# Patient Record
Sex: FEMALE | Race: WHITE | NOT HISPANIC OR LATINO | Employment: OTHER | URBAN - METROPOLITAN AREA
[De-identification: names, ages, dates, MRNs, and addresses within clinical notes are randomized per-mention and may not be internally consistent; named-entity substitution may affect disease eponyms.]

---

## 2017-12-19 ENCOUNTER — AMBULATORY - HEALTHEAST (OUTPATIENT)
Dept: NEUROLOGY | Facility: CLINIC | Age: 82
End: 2017-12-19

## 2017-12-19 ENCOUNTER — HOME CARE/HOSPICE - HEALTHEAST (OUTPATIENT)
Dept: HOME HEALTH SERVICES | Facility: HOME HEALTH | Age: 82
End: 2017-12-19

## 2017-12-19 DIAGNOSIS — G20.A1 PARKINSON DISEASE (H): ICD-10-CM

## 2017-12-22 ENCOUNTER — COMMUNICATION - HEALTHEAST (OUTPATIENT)
Dept: HOME HEALTH SERVICES | Facility: HOME HEALTH | Age: 82
End: 2017-12-22

## 2017-12-29 ENCOUNTER — HOME CARE/HOSPICE - HEALTHEAST (OUTPATIENT)
Dept: HOME HEALTH SERVICES | Facility: HOME HEALTH | Age: 82
End: 2017-12-29

## 2018-01-01 ENCOUNTER — HOME CARE/HOSPICE - HEALTHEAST (OUTPATIENT)
Dept: HOME HEALTH SERVICES | Facility: HOME HEALTH | Age: 83
End: 2018-01-01

## 2018-04-29 ENCOUNTER — ANESTHESIA - HEALTHEAST (OUTPATIENT)
Dept: SURGERY | Facility: HOSPITAL | Age: 83
End: 2018-04-29

## 2018-04-29 ENCOUNTER — SURGERY - HEALTHEAST (OUTPATIENT)
Dept: SURGERY | Facility: HOSPITAL | Age: 83
End: 2018-04-29

## 2018-04-29 ASSESSMENT — MIFFLIN-ST. JEOR: SCORE: 756.97

## 2018-05-07 ENCOUNTER — OFFICE VISIT - HEALTHEAST (OUTPATIENT)
Dept: GERIATRICS | Facility: CLINIC | Age: 83
End: 2018-05-07

## 2018-05-07 DIAGNOSIS — J69.0 ASPIRATION PNEUMONIA (H): ICD-10-CM

## 2018-05-07 DIAGNOSIS — F03.90 DEMENTIA WITHOUT BEHAVIORAL DISTURBANCE, UNSPECIFIED DEMENTIA TYPE: ICD-10-CM

## 2018-05-07 DIAGNOSIS — R13.12 OROPHARYNGEAL DYSPHAGIA: ICD-10-CM

## 2018-05-07 DIAGNOSIS — D62 ACUTE BLOOD LOSS ANEMIA: ICD-10-CM

## 2018-05-07 DIAGNOSIS — R53.81 PHYSICAL DECONDITIONING: ICD-10-CM

## 2018-05-07 DIAGNOSIS — F33.41 RECURRENT MAJOR DEPRESSIVE DISORDER, IN PARTIAL REMISSION (H): ICD-10-CM

## 2018-05-07 DIAGNOSIS — S72.002A CLOSED DISPLACED FRACTURE OF LEFT FEMORAL NECK (H): ICD-10-CM

## 2018-05-07 DIAGNOSIS — F51.01 PRIMARY INSOMNIA: ICD-10-CM

## 2018-05-07 DIAGNOSIS — Z96.649 S/P HIP HEMIARTHROPLASTY: ICD-10-CM

## 2018-05-07 DIAGNOSIS — G20.A1 PARKINSON DISEASE (H): ICD-10-CM

## 2018-05-08 ENCOUNTER — OFFICE VISIT - HEALTHEAST (OUTPATIENT)
Dept: GERIATRICS | Facility: CLINIC | Age: 83
End: 2018-05-08

## 2018-05-08 ENCOUNTER — RECORDS - HEALTHEAST (OUTPATIENT)
Dept: LAB | Facility: CLINIC | Age: 83
End: 2018-05-08

## 2018-05-08 DIAGNOSIS — G20.A1 PARKINSON DISEASE (H): ICD-10-CM

## 2018-05-08 DIAGNOSIS — S72.002A CLOSED DISPLACED FRACTURE OF LEFT FEMORAL NECK (H): ICD-10-CM

## 2018-05-08 DIAGNOSIS — J69.0 ASPIRATION PNEUMONIA OF RIGHT LOWER LOBE, UNSPECIFIED ASPIRATION PNEUMONIA TYPE (H): ICD-10-CM

## 2018-05-08 DIAGNOSIS — F03.90 DEMENTIA WITHOUT BEHAVIORAL DISTURBANCE, UNSPECIFIED DEMENTIA TYPE: ICD-10-CM

## 2018-05-09 LAB
ANION GAP SERPL CALCULATED.3IONS-SCNC: 5 MMOL/L (ref 5–18)
BASOPHILS # BLD AUTO: 0 THOU/UL (ref 0–0.2)
BASOPHILS NFR BLD AUTO: 1 % (ref 0–2)
BUN SERPL-MCNC: 25 MG/DL (ref 8–28)
CALCIUM SERPL-MCNC: 8.9 MG/DL (ref 8.5–10.5)
CHLORIDE BLD-SCNC: 111 MMOL/L (ref 98–107)
CO2 SERPL-SCNC: 29 MMOL/L (ref 22–31)
CREAT SERPL-MCNC: 0.67 MG/DL (ref 0.6–1.1)
EOSINOPHIL # BLD AUTO: 0.2 THOU/UL (ref 0–0.4)
EOSINOPHIL NFR BLD AUTO: 2 % (ref 0–6)
ERYTHROCYTE [DISTWIDTH] IN BLOOD BY AUTOMATED COUNT: 14.7 % (ref 11–14.5)
GFR SERPL CREATININE-BSD FRML MDRD: >60 ML/MIN/1.73M2
GLUCOSE BLD-MCNC: 98 MG/DL (ref 70–125)
HCT VFR BLD AUTO: 30 % (ref 35–47)
HGB BLD-MCNC: 9.7 G/DL (ref 12–16)
LYMPHOCYTES # BLD AUTO: 1.4 THOU/UL (ref 0.8–4.4)
LYMPHOCYTES NFR BLD AUTO: 18 % (ref 20–40)
MCH RBC QN AUTO: 29.8 PG (ref 27–34)
MCHC RBC AUTO-ENTMCNC: 32.3 G/DL (ref 32–36)
MCV RBC AUTO: 92 FL (ref 80–100)
MONOCYTES # BLD AUTO: 0.6 THOU/UL (ref 0–0.9)
MONOCYTES NFR BLD AUTO: 7 % (ref 2–10)
NEUTROPHILS # BLD AUTO: 5.6 THOU/UL (ref 2–7.7)
NEUTROPHILS NFR BLD AUTO: 72 % (ref 50–70)
PLATELET # BLD AUTO: 348 THOU/UL (ref 140–440)
PMV BLD AUTO: 9.7 FL (ref 8.5–12.5)
POTASSIUM BLD-SCNC: 3.8 MMOL/L (ref 3.5–5)
RBC # BLD AUTO: 3.25 MILL/UL (ref 3.8–5.4)
SODIUM SERPL-SCNC: 145 MMOL/L (ref 136–145)
WBC: 7.8 THOU/UL (ref 4–11)

## 2018-05-10 ENCOUNTER — OFFICE VISIT - HEALTHEAST (OUTPATIENT)
Dept: GERIATRICS | Facility: CLINIC | Age: 83
End: 2018-05-10

## 2018-05-10 DIAGNOSIS — D62 ACUTE BLOOD LOSS ANEMIA: ICD-10-CM

## 2018-05-10 DIAGNOSIS — S72.002A CLOSED DISPLACED FRACTURE OF LEFT FEMORAL NECK (H): ICD-10-CM

## 2018-05-10 DIAGNOSIS — F03.90 DEMENTIA WITHOUT BEHAVIORAL DISTURBANCE, UNSPECIFIED DEMENTIA TYPE: ICD-10-CM

## 2018-05-10 DIAGNOSIS — R53.81 PHYSICAL DECONDITIONING: ICD-10-CM

## 2018-05-10 DIAGNOSIS — Z96.649 S/P HIP HEMIARTHROPLASTY: ICD-10-CM

## 2018-05-10 DIAGNOSIS — G20.A1 PARKINSON DISEASE (H): ICD-10-CM

## 2018-05-10 DIAGNOSIS — R13.12 OROPHARYNGEAL DYSPHAGIA: ICD-10-CM

## 2018-05-14 ENCOUNTER — OFFICE VISIT - HEALTHEAST (OUTPATIENT)
Dept: GERIATRICS | Facility: CLINIC | Age: 83
End: 2018-05-14

## 2018-05-14 DIAGNOSIS — D62 ACUTE BLOOD LOSS ANEMIA: ICD-10-CM

## 2018-05-14 DIAGNOSIS — J69.0 ASPIRATION PNEUMONIA OF RIGHT LOWER LOBE, UNSPECIFIED ASPIRATION PNEUMONIA TYPE (H): ICD-10-CM

## 2018-05-14 DIAGNOSIS — R13.12 OROPHARYNGEAL DYSPHAGIA: ICD-10-CM

## 2018-05-14 DIAGNOSIS — S72.002A CLOSED DISPLACED FRACTURE OF LEFT FEMORAL NECK (H): ICD-10-CM

## 2018-05-14 DIAGNOSIS — F03.90 DEMENTIA WITHOUT BEHAVIORAL DISTURBANCE, UNSPECIFIED DEMENTIA TYPE: ICD-10-CM

## 2018-05-14 DIAGNOSIS — R53.81 PHYSICAL DECONDITIONING: ICD-10-CM

## 2018-05-14 DIAGNOSIS — Z96.649 S/P HIP HEMIARTHROPLASTY: ICD-10-CM

## 2018-05-15 ENCOUNTER — OFFICE VISIT - HEALTHEAST (OUTPATIENT)
Dept: GERIATRICS | Facility: CLINIC | Age: 83
End: 2018-05-15

## 2018-05-15 DIAGNOSIS — S72.002A CLOSED DISPLACED FRACTURE OF LEFT FEMORAL NECK (H): ICD-10-CM

## 2018-05-15 DIAGNOSIS — Z96.649 S/P HIP HEMIARTHROPLASTY: ICD-10-CM

## 2018-05-15 DIAGNOSIS — F03.90 DEMENTIA WITHOUT BEHAVIORAL DISTURBANCE, UNSPECIFIED DEMENTIA TYPE: ICD-10-CM

## 2018-05-15 DIAGNOSIS — G20.A1 PARKINSON DISEASE (H): ICD-10-CM

## 2018-05-17 ENCOUNTER — OFFICE VISIT - HEALTHEAST (OUTPATIENT)
Dept: GERIATRICS | Facility: CLINIC | Age: 83
End: 2018-05-17

## 2018-05-17 DIAGNOSIS — S72.002A CLOSED DISPLACED FRACTURE OF LEFT FEMORAL NECK (H): ICD-10-CM

## 2018-05-17 DIAGNOSIS — R13.12 OROPHARYNGEAL DYSPHAGIA: ICD-10-CM

## 2018-05-17 DIAGNOSIS — F03.90 DEMENTIA WITHOUT BEHAVIORAL DISTURBANCE, UNSPECIFIED DEMENTIA TYPE: ICD-10-CM

## 2018-05-17 DIAGNOSIS — D62 ACUTE BLOOD LOSS ANEMIA: ICD-10-CM

## 2018-05-17 DIAGNOSIS — J69.0 ASPIRATION PNEUMONIA OF RIGHT LOWER LOBE, UNSPECIFIED ASPIRATION PNEUMONIA TYPE (H): ICD-10-CM

## 2018-05-17 DIAGNOSIS — Z96.649 S/P HIP HEMIARTHROPLASTY: ICD-10-CM

## 2018-05-17 DIAGNOSIS — G20.A1 PARKINSON DISEASE (H): ICD-10-CM

## 2018-05-17 DIAGNOSIS — R53.81 PHYSICAL DECONDITIONING: ICD-10-CM

## 2018-05-18 ENCOUNTER — RECORDS - HEALTHEAST (OUTPATIENT)
Dept: LAB | Facility: CLINIC | Age: 83
End: 2018-05-18

## 2018-05-21 ENCOUNTER — OFFICE VISIT - HEALTHEAST (OUTPATIENT)
Dept: GERIATRICS | Facility: CLINIC | Age: 83
End: 2018-05-21

## 2018-05-21 DIAGNOSIS — F51.01 PRIMARY INSOMNIA: ICD-10-CM

## 2018-05-21 DIAGNOSIS — G20.A1 PARKINSON DISEASE (H): ICD-10-CM

## 2018-05-21 DIAGNOSIS — R13.12 OROPHARYNGEAL DYSPHAGIA: ICD-10-CM

## 2018-05-21 DIAGNOSIS — Z96.649 S/P HIP HEMIARTHROPLASTY: ICD-10-CM

## 2018-05-21 DIAGNOSIS — D62 ACUTE BLOOD LOSS ANEMIA: ICD-10-CM

## 2018-05-21 DIAGNOSIS — R53.81 PHYSICAL DECONDITIONING: ICD-10-CM

## 2018-05-21 DIAGNOSIS — S72.002A CLOSED FRACTURE OF LEFT HIP, INITIAL ENCOUNTER (H): ICD-10-CM

## 2018-05-21 DIAGNOSIS — F33.41 RECURRENT MAJOR DEPRESSIVE DISORDER, IN PARTIAL REMISSION (H): ICD-10-CM

## 2018-05-21 DIAGNOSIS — F03.90 DEMENTIA WITHOUT BEHAVIORAL DISTURBANCE, UNSPECIFIED DEMENTIA TYPE: ICD-10-CM

## 2018-05-21 LAB
BASOPHILS # BLD AUTO: 0.1 THOU/UL (ref 0–0.2)
BASOPHILS NFR BLD AUTO: 1 % (ref 0–2)
EOSINOPHIL # BLD AUTO: 0.2 THOU/UL (ref 0–0.4)
EOSINOPHIL NFR BLD AUTO: 3 % (ref 0–6)
ERYTHROCYTE [DISTWIDTH] IN BLOOD BY AUTOMATED COUNT: 15.9 % (ref 11–14.5)
HCT VFR BLD AUTO: 37.3 % (ref 35–47)
HGB BLD-MCNC: 11.3 G/DL (ref 12–16)
LYMPHOCYTES # BLD AUTO: 1 THOU/UL (ref 0.8–4.4)
LYMPHOCYTES NFR BLD AUTO: 16 % (ref 20–40)
MCH RBC QN AUTO: 29 PG (ref 27–34)
MCHC RBC AUTO-ENTMCNC: 30.3 G/DL (ref 32–36)
MCV RBC AUTO: 96 FL (ref 80–100)
MONOCYTES # BLD AUTO: 0.3 THOU/UL (ref 0–0.9)
MONOCYTES NFR BLD AUTO: 5 % (ref 2–10)
NEUTROPHILS # BLD AUTO: 4.6 THOU/UL (ref 2–7.7)
NEUTROPHILS NFR BLD AUTO: 74 % (ref 50–70)
PLATELET # BLD AUTO: 320 THOU/UL (ref 140–440)
PMV BLD AUTO: 9.9 FL (ref 8.5–12.5)
RBC # BLD AUTO: 3.89 MILL/UL (ref 3.8–5.4)
WBC: 6.2 THOU/UL (ref 4–11)

## 2018-05-22 ENCOUNTER — OFFICE VISIT - HEALTHEAST (OUTPATIENT)
Dept: GERIATRICS | Facility: CLINIC | Age: 83
End: 2018-05-22

## 2018-05-22 DIAGNOSIS — Z96.649 S/P HIP HEMIARTHROPLASTY: ICD-10-CM

## 2018-05-22 DIAGNOSIS — F03.90 DEMENTIA WITHOUT BEHAVIORAL DISTURBANCE, UNSPECIFIED DEMENTIA TYPE: ICD-10-CM

## 2018-05-22 DIAGNOSIS — G20.A1 PARKINSON DISEASE (H): ICD-10-CM

## 2018-05-22 DIAGNOSIS — L12.0 BULLOUS PEMPHIGOID (H): ICD-10-CM

## 2018-05-22 DIAGNOSIS — J69.0 ASPIRATION PNEUMONIA OF RIGHT LOWER LOBE, UNSPECIFIED ASPIRATION PNEUMONIA TYPE (H): ICD-10-CM

## 2018-05-22 DIAGNOSIS — S72.002A CLOSED DISPLACED FRACTURE OF LEFT FEMORAL NECK (H): ICD-10-CM

## 2018-05-24 ENCOUNTER — OFFICE VISIT - HEALTHEAST (OUTPATIENT)
Dept: GERIATRICS | Facility: CLINIC | Age: 83
End: 2018-05-24

## 2018-05-24 DIAGNOSIS — F51.01 PRIMARY INSOMNIA: ICD-10-CM

## 2018-05-24 DIAGNOSIS — S72.002A CLOSED DISPLACED FRACTURE OF LEFT FEMORAL NECK (H): ICD-10-CM

## 2018-05-24 DIAGNOSIS — R13.12 OROPHARYNGEAL DYSPHAGIA: ICD-10-CM

## 2018-05-24 DIAGNOSIS — R53.81 PHYSICAL DECONDITIONING: ICD-10-CM

## 2018-05-24 DIAGNOSIS — G20.A1 PARKINSON DISEASE (H): ICD-10-CM

## 2018-05-24 DIAGNOSIS — R25.1 TREMOR OF UNKNOWN ORIGIN: ICD-10-CM

## 2018-05-24 DIAGNOSIS — F03.90 DEMENTIA WITHOUT BEHAVIORAL DISTURBANCE, UNSPECIFIED DEMENTIA TYPE: ICD-10-CM

## 2018-05-29 ENCOUNTER — OFFICE VISIT - HEALTHEAST (OUTPATIENT)
Dept: GERIATRICS | Facility: CLINIC | Age: 83
End: 2018-05-29

## 2018-05-29 DIAGNOSIS — G20.A1 PARKINSON DISEASE (H): ICD-10-CM

## 2018-05-29 DIAGNOSIS — F03.90 DEMENTIA WITHOUT BEHAVIORAL DISTURBANCE, UNSPECIFIED DEMENTIA TYPE: ICD-10-CM

## 2018-05-29 DIAGNOSIS — J69.0 ASPIRATION PNEUMONIA OF RIGHT LOWER LOBE, UNSPECIFIED ASPIRATION PNEUMONIA TYPE (H): ICD-10-CM

## 2018-05-29 DIAGNOSIS — S72.002A CLOSED DISPLACED FRACTURE OF LEFT FEMORAL NECK (H): ICD-10-CM

## 2018-06-01 ENCOUNTER — OFFICE VISIT - HEALTHEAST (OUTPATIENT)
Dept: GERIATRICS | Facility: CLINIC | Age: 83
End: 2018-06-01

## 2018-06-01 DIAGNOSIS — R53.81 PHYSICAL DECONDITIONING: ICD-10-CM

## 2018-06-01 DIAGNOSIS — Z96.649 S/P HIP HEMIARTHROPLASTY: ICD-10-CM

## 2018-06-01 DIAGNOSIS — R13.12 OROPHARYNGEAL DYSPHAGIA: ICD-10-CM

## 2018-06-01 DIAGNOSIS — S72.002A CLOSED DISPLACED FRACTURE OF LEFT FEMORAL NECK (H): ICD-10-CM

## 2018-06-01 DIAGNOSIS — F03.90 DEMENTIA WITHOUT BEHAVIORAL DISTURBANCE, UNSPECIFIED DEMENTIA TYPE: ICD-10-CM

## 2018-06-01 DIAGNOSIS — G20.A1 PARKINSON DISEASE (H): ICD-10-CM

## 2018-06-01 DIAGNOSIS — S72.002A CLOSED FRACTURE OF LEFT HIP, INITIAL ENCOUNTER (H): ICD-10-CM

## 2018-06-04 ENCOUNTER — OFFICE VISIT - HEALTHEAST (OUTPATIENT)
Dept: GERIATRICS | Facility: CLINIC | Age: 83
End: 2018-06-04

## 2018-06-04 DIAGNOSIS — F33.41 RECURRENT MAJOR DEPRESSIVE DISORDER, IN PARTIAL REMISSION (H): ICD-10-CM

## 2018-06-04 DIAGNOSIS — R53.81 PHYSICAL DECONDITIONING: ICD-10-CM

## 2018-06-04 DIAGNOSIS — F03.90 DEMENTIA WITHOUT BEHAVIORAL DISTURBANCE, UNSPECIFIED DEMENTIA TYPE: ICD-10-CM

## 2018-06-04 DIAGNOSIS — G20.A1 PARKINSON DISEASE (H): ICD-10-CM

## 2018-06-04 DIAGNOSIS — S72.002A CLOSED FRACTURE OF LEFT HIP, INITIAL ENCOUNTER (H): ICD-10-CM

## 2018-06-04 DIAGNOSIS — Z96.649 S/P HIP HEMIARTHROPLASTY: ICD-10-CM

## 2018-06-05 ENCOUNTER — OFFICE VISIT - HEALTHEAST (OUTPATIENT)
Dept: GERIATRICS | Facility: CLINIC | Age: 83
End: 2018-06-05

## 2018-06-05 DIAGNOSIS — S72.002A CLOSED DISPLACED FRACTURE OF LEFT FEMORAL NECK (H): ICD-10-CM

## 2018-06-05 DIAGNOSIS — J69.0 ASPIRATION PNEUMONIA OF RIGHT LOWER LOBE, UNSPECIFIED ASPIRATION PNEUMONIA TYPE (H): ICD-10-CM

## 2018-06-05 DIAGNOSIS — G20.A1 PARKINSON DISEASE (H): ICD-10-CM

## 2018-06-08 ENCOUNTER — AMBULATORY - HEALTHEAST (OUTPATIENT)
Dept: GERIATRICS | Facility: CLINIC | Age: 83
End: 2018-06-08

## 2020-01-01 ENCOUNTER — HOME CARE/HOSPICE - HEALTHEAST (OUTPATIENT)
Dept: HOSPICE | Facility: HOSPICE | Age: 85
End: 2020-01-01

## 2020-01-01 ENCOUNTER — OFFICE VISIT (OUTPATIENT)
Dept: NEUROLOGY | Facility: CLINIC | Age: 85
End: 2020-01-01
Payer: COMMERCIAL

## 2020-01-01 ENCOUNTER — AMBULATORY - HEALTHEAST (OUTPATIENT)
Dept: FAMILY MEDICINE | Facility: CLINIC | Age: 85
End: 2020-01-01

## 2020-01-01 ENCOUNTER — HOME CARE/HOSPICE - HEALTHEAST (OUTPATIENT)
Dept: HOME HEALTH SERVICES | Facility: HOME HEALTH | Age: 85
End: 2020-01-01

## 2020-01-01 ENCOUNTER — COMMUNICATION - HEALTHEAST (OUTPATIENT)
Dept: SCHEDULING | Facility: CLINIC | Age: 85
End: 2020-01-01

## 2020-01-01 ENCOUNTER — AMBULATORY - HEALTHEAST (OUTPATIENT)
Dept: OTHER | Facility: CLINIC | Age: 85
End: 2020-01-01

## 2020-01-01 ENCOUNTER — AMBULATORY - HEALTHEAST (OUTPATIENT)
Dept: HOSPICE | Facility: HOSPICE | Age: 85
End: 2020-01-01

## 2020-01-01 VITALS — HEIGHT: 59 IN | BODY MASS INDEX: 19.15 KG/M2 | WEIGHT: 95 LBS

## 2020-01-01 DIAGNOSIS — F02.80 DEMENTIA DUE TO PARKINSON'S DISEASE WITHOUT BEHAVIORAL DISTURBANCE (H): ICD-10-CM

## 2020-01-01 DIAGNOSIS — G20.A1 PARKINSON DISEASE (H): Primary | ICD-10-CM

## 2020-01-01 DIAGNOSIS — G20.A1 DEMENTIA DUE TO PARKINSON'S DISEASE WITHOUT BEHAVIORAL DISTURBANCE (H): ICD-10-CM

## 2020-01-01 PROCEDURE — 99214 OFFICE O/P EST MOD 30 MIN: CPT | Mod: 95 | Performed by: PSYCHIATRY & NEUROLOGY

## 2020-01-01 RX ORDER — MIRTAZAPINE 15 MG/1
15 TABLET, FILM COATED ORAL
COMMUNITY
Start: 2020-01-01

## 2020-01-01 RX ORDER — SENNOSIDES A AND B 8.6 MG/1
TABLET, FILM COATED ORAL
COMMUNITY

## 2020-01-01 RX ORDER — CARBIDOPA AND LEVODOPA 25; 100 MG/1; MG/1
1 TABLET ORAL 3 TIMES DAILY
COMMUNITY

## 2020-01-01 RX ORDER — CARBIDOPA AND LEVODOPA 25; 100 MG/1; MG/1
1 TABLET, EXTENDED RELEASE ORAL
COMMUNITY
Start: 2020-01-01

## 2020-01-01 RX ORDER — DONEPEZIL HYDROCHLORIDE 10 MG/1
TABLET, FILM COATED ORAL
COMMUNITY
Start: 2020-01-01

## 2020-01-01 ASSESSMENT — MIFFLIN-ST. JEOR
SCORE: 751.55
SCORE: 762.42

## 2020-06-16 PROBLEM — E66.3 OVERWEIGHT: Status: ACTIVE | Noted: 2020-01-01

## 2020-06-16 PROBLEM — M81.0 SENILE OSTEOPOROSIS: Status: ACTIVE | Noted: 2020-01-01

## 2020-06-16 PROBLEM — R42 DIZZINESS ON STANDING: Status: ACTIVE | Noted: 2020-01-01

## 2020-06-16 PROBLEM — F41.0 PANIC DISORDER WITHOUT AGORAPHOBIA: Status: ACTIVE | Noted: 2020-01-01

## 2020-06-16 PROBLEM — R41.3 MEMORY IMPAIRMENT: Status: ACTIVE | Noted: 2020-01-01

## 2020-06-16 PROBLEM — F32.A DEPRESSION: Status: ACTIVE | Noted: 2018-05-07

## 2020-06-16 PROBLEM — R53.81 PHYSICAL DECONDITIONING: Status: ACTIVE | Noted: 2018-05-06

## 2020-06-16 PROBLEM — R25.1 TREMOR: Status: ACTIVE | Noted: 2020-01-01

## 2020-06-16 PROBLEM — F03.90 DEMENTIA (H): Status: ACTIVE | Noted: 2018-05-07

## 2020-06-16 PROBLEM — H90.2 CONDUCTIVE HEARING LOSS, UNILATERAL WITH UNRESTRICTED HEARING ON THE CONTRALATERAL SIDE: Status: ACTIVE | Noted: 2020-01-01

## 2020-06-16 PROBLEM — R63.4 ABNORMAL WEIGHT LOSS: Status: ACTIVE | Noted: 2020-01-01

## 2020-06-16 PROBLEM — R13.10 DYSPHAGIA: Status: ACTIVE | Noted: 2020-01-01

## 2020-06-16 PROBLEM — G20.A1 PARKINSON'S DISEASE (H): Status: ACTIVE | Noted: 2020-01-01

## 2020-06-16 PROBLEM — R07.9 CHEST PAIN: Status: ACTIVE | Noted: 2020-01-01

## 2020-06-16 PROBLEM — S72.002A CLOSED DISPLACED FRACTURE OF LEFT FEMORAL NECK (H): Status: ACTIVE | Noted: 2018-04-29

## 2020-06-16 PROBLEM — S72.009A HIP FRACTURE (H): Status: ACTIVE | Noted: 2018-04-29

## 2020-06-16 NOTE — PROGRESS NOTES
NEUROLOGY NOTE        Assessment/Plan          Idiopathic Parkinson disease. Will check temperature 2 times a month, blood pressure 2 times a week.  If they are all normal stool not moving as much with slowness, will then increase Sinemet to 1.5  tab tid, after 2 weeks if still not improving but tolerating fine will increase to 2 tablets each time.  However if the blood pressure or temperature not normal they will call us but not increase the medication in dosage.  Discussed about potential side effects of medication changes.  She did have trouble to tolerate higher dose in the past due to stomach problems.    REM sleep behavior: Occasional dreams during sleep acting our dreams, as seen in REM sleep disorder. No not need to be treated at this point.     Memory difficulty: scored 21/30 on 8/9/2016. On Aricept. Not interested in being tested again. Taking vitamins.     Bereavement due to loss of  in May 2020.  Seem to be doing adequate without need any medications.    Discussed with patient and family about prognosis. Will f/u in 6 months.  But if needed we will see her early at any time.    This is a telephone visit due to COVID-19 Pandemic to mitigate potential disease spreading. Consent to charge obtained for call visit. Total time spent about 22 minutes.             SUBJECTIVE       Christen Loya is here today for follow up of Parkinson s disease with her son, Louis, a cancer survivor not able to come ( her daughter, Virginia who lives in [place] is not here today).     Last visit visit 1/2020.  passed away last month, 5/2020.     Parkinson disease is progressing.  Most often wheelchair-bound.  She is eating adequate and intake fine per family.  She is still only 80 pounds or so.  She is tiny.  Body weight stable.  Her activity is slowing down.    ADL: lives with son, Jersey, the caregiver. Daughter lives in [place], and drives RV back to stay with her in the summer months. 2 grand-kids from her  "daughter live here, and able to give a hand for few hours, bath them. Walk with walker and minimal assistance within the house. Otherwise wheelchair bound.    Fell 4/2018 and broke left hip, had surgery. Lost the right hand tremor initially, now came back. No further falls.    Stabilized weight, even gained a few. More schuffling, but not interested increasing the dose of Sinemet.    Not exercising regularly. No depression. Memory no changes. Sleeping well on mirtazapine, occasional dreams. No significant hallucinations.    PD on Sinemet 25/100 1 tabs tid and one Sinemet 25/100 CR HS. Not able to tolerate higher dose in the past.    Autonomic: sometimes, light headed when up walking at time. No syncope.    Therapies: Will refer. Discussed importance of exercise.    Memory: MOCA May 12, 2015 scored 28 out of 30. She scored 21/30 on 8/9/2016. On Aricept.    No depression.      History review:  She was diagnosed with Parkinson s disease by Rusty Maldonado in 2012. She had right handed tremor and in right legs, probably starting in 2011 and feeling stiff in general. She has poor handwriting, soft speech, and poor balance. She feels the carbidopa/levodopa has provided her significant clinical improvements. She has been followed up here in our clinic for just about one year since June 2013. Her Parkinson s disease symptoms are minimal with the visit at our clinic. Probably, due to she is on medications symptoms are controlled. They do understand there is still time to go to differentiate to more definitively between idiopathic Parkinson s disease versus parkinsonian syndrome.      NICANOR scan on June 27, 2013 showing abnormal findings most compatible with parkinsonism condition             Review of system     10 point system review otherwise unremarkable    PHYSICAL EXAMINATION     Vital signs in last 24 hours:  Vitals:    06/16/20 1110   Weight: 43.1 kg (95 lb)   Height: 1.499 m (4' 11\")       This is a telephone " call.        Problem List     Patient Active Problem List    Diagnosis Date Noted     Parkinson's disease (H) 06/16/2020     Priority: Medium     Tremor 06/16/2020     Priority: Medium     Panic disorder without agoraphobia 06/16/2020     Priority: Medium     Memory impairment 06/16/2020     Priority: Medium     Dysphagia 06/16/2020     Priority: Medium     Dizziness on standing 06/16/2020     Priority: Medium     Conductive hearing loss, unilateral with unrestricted hearing on the contralateral side 06/16/2020     Priority: Medium     Abnormal weight loss 06/16/2020     Priority: Medium     Senile osteoporosis 06/16/2020     Priority: Medium     Chest pain 02/06/2020     Priority: Medium     Depression 05/07/2018     Priority: Medium     Dementia (H) 05/07/2018     Priority: Medium     Physical deconditioning 05/06/2018     Priority: Medium     Hip fracture (H) 04/29/2018     Priority: Medium     Closed displaced fracture of left femoral neck (H) 04/29/2018     Priority: Medium     Added automatically from request for surgery [id number]           Past medical history     History reviewed. No pertinent surgical history.    History reviewed. No pertinent past medical history.      Family history     Family History   Problem Relation Age of Onset     No Known Problems Mother      No Known Problems Father          Social history     Social History     Socioeconomic History     Marital status:      Spouse name: Not on file     Number of children: Not on file     Years of education: Not on file     Highest education level: Not on file   Occupational History     Not on file   Social Needs     Financial resource strain: Not on file     Food insecurity     Worry: Not on file     Inability: Not on file     Transportation needs     Medical: Not on file     Non-medical: Not on file   Tobacco Use     Smoking status: Never Smoker     Smokeless tobacco: Never Used   Substance and Sexual Activity     Alcohol use: Not on file      Drug use: Not on file     Sexual activity: Not on file   Lifestyle     Physical activity     Days per week: Not on file     Minutes per session: Not on file     Stress: Not on file   Relationships     Social connections     Talks on phone: Not on file     Gets together: Not on file     Attends Orthodox service: Not on file     Active member of club or organization: Not on file     Attends meetings of clubs or organizations: Not on file     Relationship status: Not on file     Intimate partner violence     Fear of current or ex partner: Not on file     Emotionally abused: Not on file     Physically abused: Not on file     Forced sexual activity: Not on file   Other Topics Concern     Parent/sibling w/ CABG, MI or angioplasty before 65F 55M? Not Asked   Social History Narrative     Not on file         Allergy     Meperidine and Sulfa drugs    MEDICATIONS List     Current Outpatient Medications   Medication Sig Dispense Refill     carbidopa-levodopa (SINEMET CR)  MG CR tablet Take 1 tablet by mouth       carbidopa-levodopa (SINEMET)  MG tablet 1 tablet 3 times daily        donepezil (ARICEPT) 10 MG tablet 1 tab(s)       mirtazapine (REMERON) 15 MG tablet Take 15 mg by mouth       senna (SENNA-TABS) 8.6 MG tablet 1 tab(s)                   RESULTS REVIEW         Jeimy Vincent MD, MD, PhD  Neurology   Office tel: [phone]

## 2020-06-16 NOTE — LETTER
6/16/2020         RE: Christen Loya  [address] [address]        Dear Colleague,    Thank you for referring your patient, Christen Loya, to the [facility] NEUROLOGY [place]. Please see a copy of my visit note below.        NEUROLOGY NOTE        Assessment/Plan          Idiopathic Parkinson disease. Will check temperature 2 times a month, blood pressure 2 times a week.  If they are all normal stool not moving as much with slowness, will then increase Sinemet to 1.5  tab tid, after 2 weeks if still not improving but tolerating fine will increase to 2 tablets each time.  However if the blood pressure or temperature not normal they will call us but not increase the medication in dosage.  Discussed about potential side effects of medication changes.  She did have trouble to tolerate higher dose in the past due to stomach problems.    REM sleep behavior: Occasional dreams during sleep acting our dreams, as seen in REM sleep disorder. No not need to be treated at this point.     Memory difficulty: scored 21/30 on 8/9/2016. On Aricept. Not interested in being tested again. Taking vitamins.     Bereavement due to loss of  in May 2020.  Seem to be doing adequate without need any medications.    Discussed with patient and family about prognosis. Will f/u in 6 months.  But if needed we will see her early at any time.    This is a telephone visit due to COVID-19 Pandemic to mitigate potential disease spreading. Consent to charge obtained for call visit. Total time spent about 22 minutes.             SUBJECTIVE       Christen Loya is here today for follow up of Parkinson s disease with her son, Louis, a cancer survivor not able to come ( her daughter, [place] who lives in [place] is not here today).     Last visit visit 1/2020.  passed away last month, 5/2020.     Parkinson disease is progressing.  Most often wheelchair-bound.  She is eating adequate and intake fine per family.  She is still  only 80 pounds or so.  She is tiny.  Body weight stable.  Her activity is slowing down.    ADL: lives with son, [place], the caregiver. Daughter lives in [place], and drives RV back to stay with her in the summer months. 2 grand-kids from her daughter live here, and able to give a hand for few hours, bath them. Walk with walker and minimal assistance within the house. Otherwise wheelchair bound.    Fell 4/2018 and broke left hip, had surgery. Lost the right hand tremor initially, now came back. No further falls.    Stabilized weight, even gained a few. More schuffling, but not interested increasing the dose of Sinemet.    Not exercising regularly. No depression. Memory no changes. Sleeping well on mirtazapine, occasional dreams. No significant hallucinations.    PD on Sinemet 25/100 1 tabs tid and one Sinemet 25/100 CR HS. Not able to tolerate higher dose in the past.    Autonomic: sometimes, light headed when up walking at time. No syncope.    Therapies: Will refer. Discussed importance of exercise.    Memory: MOCA May 12, 2015 scored 28 out of 30. She scored 21/30 on 8/9/2016. On Aricept.    No depression.      History review:  She was diagnosed with Parkinson s disease by Rusty Maldonado in 2012. She had right handed tremor and in right legs, probably starting in 2011 and feeling stiff in general. She has poor handwriting, soft speech, and poor balance. She feels the carbidopa/levodopa has provided her significant clinical improvements. She has been followed up here in our clinic for just about one year since June 2013. Her Parkinson s disease symptoms are minimal with the visit at our clinic. Probably, due to she is on medications symptoms are controlled. They do understand there is still time to go to differentiate to more definitively between idiopathic Parkinson s disease versus parkinsonian syndrome.      NICANOR scan on June 27, 2013 showing abnormal findings most compatible with parkinsonism condition      "        Review of system     10 point system review otherwise unremarkable    PHYSICAL EXAMINATION     Vital signs in last 24 hours:  Vitals:    06/16/20 1110   Weight: 43.1 kg (95 lb)   Height: 1.499 m (4' 11\")       This is a telephone call.        Problem List     Patient Active Problem List    Diagnosis Date Noted     Parkinson's disease (H) 06/16/2020     Priority: Medium     Tremor 06/16/2020     Priority: Medium     Panic disorder without agoraphobia 06/16/2020     Priority: Medium     Memory impairment 06/16/2020     Priority: Medium     Dysphagia 06/16/2020     Priority: Medium     Dizziness on standing 06/16/2020     Priority: Medium     Conductive hearing loss, unilateral with unrestricted hearing on the contralateral side 06/16/2020     Priority: Medium     Abnormal weight loss 06/16/2020     Priority: Medium     Senile osteoporosis 06/16/2020     Priority: Medium     Chest pain 02/06/2020     Priority: Medium     Depression 05/07/2018     Priority: Medium     Dementia (H) 05/07/2018     Priority: Medium     Physical deconditioning 05/06/2018     Priority: Medium     Hip fracture (H) 04/29/2018     Priority: Medium     Closed displaced fracture of left femoral neck (H) 04/29/2018     Priority: Medium     Added automatically from request for surgery [id number]           Past medical history     History reviewed. No pertinent surgical history.    History reviewed. No pertinent past medical history.      Family history     Family History   Problem Relation Age of Onset     No Known Problems Mother      No Known Problems Father          Social history     Social History     Socioeconomic History     Marital status:      Spouse name: Not on file     Number of children: Not on file     Years of education: Not on file     Highest education level: Not on file   Occupational History     Not on file   Social Needs     Financial resource strain: Not on file     Food insecurity     Worry: Not on file     " Inability: Not on file     Transportation needs     Medical: Not on file     Non-medical: Not on file   Tobacco Use     Smoking status: Never Smoker     Smokeless tobacco: Never Used   Substance and Sexual Activity     Alcohol use: Not on file     Drug use: Not on file     Sexual activity: Not on file   Lifestyle     Physical activity     Days per week: Not on file     Minutes per session: Not on file     Stress: Not on file   Relationships     Social connections     Talks on phone: Not on file     Gets together: Not on file     Attends Sikhism service: Not on file     Active member of club or organization: Not on file     Attends meetings of clubs or organizations: Not on file     Relationship status: Not on file     Intimate partner violence     Fear of current or ex partner: Not on file     Emotionally abused: Not on file     Physically abused: Not on file     Forced sexual activity: Not on file   Other Topics Concern     Parent/sibling w/ CABG, MI or angioplasty before 65F 55M? Not Asked   Social History Narrative     Not on file         Allergy     Meperidine and Sulfa drugs    MEDICATIONS List     Current Outpatient Medications   Medication Sig Dispense Refill     carbidopa-levodopa (SINEMET CR)  MG CR tablet Take 1 tablet by mouth       carbidopa-levodopa (SINEMET)  MG tablet 1 tablet 3 times daily        donepezil (ARICEPT) 10 MG tablet 1 tab(s)       mirtazapine (REMERON) 15 MG tablet Take 15 mg by mouth       senna (SENNA-TABS) 8.6 MG tablet 1 tab(s)                   RESULTS REVIEW         Jeimy Vincent MD, MD, PhD  Neurology   Office tel: [phone]        Again, thank you for allowing me to participate in the care of your patient.        Sincerely,        Jeimy Vincent MD

## 2021-05-27 VITALS
RESPIRATION RATE: 20 BRPM | SYSTOLIC BLOOD PRESSURE: 120 MMHG | OXYGEN SATURATION: 90 % | DIASTOLIC BLOOD PRESSURE: 60 MMHG | HEART RATE: 76 BPM

## 2021-05-27 VITALS
RESPIRATION RATE: 13 BRPM | OXYGEN SATURATION: 94 % | SYSTOLIC BLOOD PRESSURE: 110 MMHG | DIASTOLIC BLOOD PRESSURE: 60 MMHG | HEART RATE: 78 BPM

## 2021-05-27 VITALS
HEART RATE: 115 BPM | DIASTOLIC BLOOD PRESSURE: 50 MMHG | OXYGEN SATURATION: 84 % | SYSTOLIC BLOOD PRESSURE: 80 MMHG | RESPIRATION RATE: 28 BRPM

## 2021-05-27 VITALS — DIASTOLIC BLOOD PRESSURE: 74 MMHG | HEART RATE: 87 BPM | SYSTOLIC BLOOD PRESSURE: 116 MMHG

## 2021-05-27 VITALS
TEMPERATURE: 97.9 F | RESPIRATION RATE: 18 BRPM | OXYGEN SATURATION: 94 % | SYSTOLIC BLOOD PRESSURE: 151 MMHG | DIASTOLIC BLOOD PRESSURE: 92 MMHG | HEART RATE: 84 BPM

## 2021-05-27 VITALS — DIASTOLIC BLOOD PRESSURE: 80 MMHG | SYSTOLIC BLOOD PRESSURE: 120 MMHG

## 2021-05-27 VITALS — DIASTOLIC BLOOD PRESSURE: 88 MMHG | HEART RATE: 87 BPM | SYSTOLIC BLOOD PRESSURE: 133 MMHG

## 2021-05-30 ENCOUNTER — RECORDS - HEALTHEAST (OUTPATIENT)
Dept: ADMINISTRATIVE | Facility: CLINIC | Age: 86
End: 2021-05-30

## 2021-05-31 ENCOUNTER — RECORDS - HEALTHEAST (OUTPATIENT)
Dept: ADMINISTRATIVE | Facility: CLINIC | Age: 86
End: 2021-05-31

## 2021-06-01 VITALS — WEIGHT: 93.8 LBS | HEIGHT: 60 IN | BODY MASS INDEX: 18.42 KG/M2

## 2021-06-01 VITALS — WEIGHT: 92.8 LBS | BODY MASS INDEX: 18.12 KG/M2

## 2021-06-03 ENCOUNTER — RECORDS - HEALTHEAST (OUTPATIENT)
Dept: ADMINISTRATIVE | Facility: CLINIC | Age: 86
End: 2021-06-03

## 2021-06-04 VITALS
OXYGEN SATURATION: 96 % | TEMPERATURE: 97.8 F | BODY MASS INDEX: 18.65 KG/M2 | RESPIRATION RATE: 18 BRPM | HEIGHT: 60 IN | SYSTOLIC BLOOD PRESSURE: 110 MMHG | DIASTOLIC BLOOD PRESSURE: 66 MMHG | WEIGHT: 95 LBS | HEART RATE: 72 BPM

## 2021-06-17 NOTE — PROGRESS NOTES
[facility]    DATE: 2018    NAME:  Christen Loya             :  10/6/1928  MRN: [id number]  CODE STATUS:  FULL CODE    VISIT TYPE: Problem Visit (hospital f/u)     FACILITY:  [facility] [[id number]]       CHIEF COMPLAIN/REASON FOR VISIT:    Chief Complaint   Patient presents with     Problem Visit     hospital f/u               HISTORY OF PRESENT ILLNESS: Christen Loya is a 89 y.o. female who was admitted - for fall, Left hip fracture. Postop she had lethargy with pain meds as well as aspiration pneumonia and treated with levaquin then later zosyn and augmentin. ST evaluated and was changed to thickened liquids. She also had urinary retention and otto was removed but required straight cath as needed. She was discharged on bladder scan twice daily. Her oxycodone was discontinued and started on tramadol and tylenol. She developed loose stools and c diff was sent. She has PMH of Parkinson's disease, depression, anxiety, insomnia. Prior to this she lived at home in a house with her  and son.     Today Ms. Loya states she is not having much pain and not asking for anything for pain. She is tired although she did sleep well last night. She is laying in bed and says she just felt like she needed to rest. She says she is not having any trouble urinating and denies pain, frequency, urgency, or incontinence. She says her bowels are moving without any trouble. She has tremors from her parkinson's but does not feel that they have not gotten any worse recently. She says her appetite is good but she does not like the thickened liquids. She has no other concerns today. Per staff she has been forgetful, bladder scanning twice daily but readings have been -[id number]. She has not been complaining of pain or asking for any pain meds, just gets the scheduled tylenol. Per staff vitals are stable.     REVIEW OF SYSTEMS:  PROBLEMS AND REVIEW OF SYSTEMS:    Review of Systems  Today on ROS:   Currently, no fever, chills, or rigors. Decreased vision and hearing. Denies any chest pain, headaches, palpitations, lightheadedness, dizziness, shortness of breath, or cough. Appetite is good. Denies any GERD symptoms. Denies any difficulty with swallowing, nausea, or vomiting.  Denies any abdominal pain, diarrhea or constipation. Denies any urinary symptoms. No insomnia. No active bleeding. No rash. Positive for forgetfulness, fatigue, baseline tremor, dysphagia      Allergies   Allergen Reactions     Demerol [Meperidine]      Sulfa (Sulfonamide Antibiotics)      Current Outpatient Prescriptions   Medication Sig     acetaminophen (TYLENOL) 500 MG tablet Take 500 mg by mouth every 12 (twelve) hours as needed for pain.     acetaminophen (TYLENOL) 500 MG tablet Take 2 tablets (1,000 mg total) by mouth 3 (three) times a day.     amoxicillin-clavulanate (AUGMENTIN) 875-125 mg per tablet Take 1 tablet by mouth 2 (two) times a day for 7 days. For RLL pneumonia     aspirin 325 MG tablet Take 1 tablet (325 mg total) by mouth 2 (two) times a day for 23 days.     carbidopa-levodopa (SINEMET CR)  mg ER tablet Take 1 tablet by mouth bedtime.     carbidopa-levodopa (SINEMET)  mg per tablet Take 1.5 tablets by mouth 3 (three) times a day. Take at 8am, 12pm, and 4pm     donepezil (ARICEPT) 10 MG tablet Take 10 mg by mouth at bedtime. Indications: Mild to Moderate Alzheimer's Type Dementia     mirtazapine (REMERON) 15 MG tablet Take 15 mg by mouth at bedtime. Indications: major depressive disorder     senna-docusate (PERICOLACE) 8.6-50 mg tablet Take 1 tablet by mouth 2 (two) times a day.     traMADol (ULTRAM) 50 mg tablet Take 0.5 tablets (25 mg total) by mouth every 6 (six) hours as needed for severe pain (7-10).     Past Medical History:    Past Medical History:   Diagnosis Date     Cataract     bilat     Parkinson disease            PHYSICAL EXAMINATION  Vitals:    05/07/18  0700   BP: 117/56   Pulse: 87   Resp: 17   Temp: 99  F (37.2  C)   SpO2: 97%       Today on physical exam:     GENERAL: Awake, Alert, oriented x3, not in any form of acute distress, answers questions appropriately, follows simple commands, conversant  HEENT: Head is normocephalic with normal hair distribution. No evidence of trauma. Ears: No acute purulent discharge. Eyes: Conjunctivae pink with no scleral jaundice. Nose: Normal mucosa and septum. NECK: Supple with no cervical or supraclavicular lymphadenopathy. Trachea is midline.   CHEST: No tenderness or deformity, no crepitus  LUNG: Dim to auscultation with good chest expansion. There are no crackles or wheezes, normal AP diameter.  BACK: No kyphosis of the thoracic spine. Symmetric, no curvature, ROM normal, no CVA tenderness, no spinal tenderness   CVS: There is good S1  S2, regular rhythm, there are no murmurs, rubs, gallops, or heaves,  2+ pulses symmetric in all extremities.  ABDOMEN: Rounded and soft, nontender to palpation, non distended, no masses, no organomegaly, good bowel sounds, no rebound or guarding, no peritoneal signs.   EXTREMITIES: 2+ lle pedal edema, Left hip incision c/d/i  SKIN: Warm and dry, no erythema noted.  Skin color, texture, no rashes or lesions.  NEUROLOGICAL: The patient is oriented to person, place and time, forgetful. Weakness, fatigue, baseline tremor, shuffled gait            LABS:   Recent Results (from the past 168 hour(s))   Hemoglobin - Daily x 2   Result Value Ref Range    Hemoglobin 10.6 (L) 12.0 - 16.0 g/dL   Basic Metabolic Panel   Result Value Ref Range    Sodium 134 (L) 136 - 145 mmol/L    Potassium 4.6 3.5 - 5.0 mmol/L    Chloride 103 98 - 107 mmol/L    CO2 25 22 - 31 mmol/L    Anion Gap, Calculation 6 5 - 18 mmol/L    Glucose 113 70 - 125 mg/dL    Calcium 8.2 (L) 8.5 - 10.5 mg/dL    BUN 13 8 - 28 mg/dL    Creatinine 0.63 0.60 - 1.10 mg/dL    GFR MDRD Af Amer >60 >60 mL/min/1.73m2    GFR MDRD Non Af Amer >60 >60  mL/min/1.73m2   POCT Glucose   Result Value Ref Range    Glucose,  mg/dL   White Blood Count (WBC)   Result Value Ref Range    WBC 10.0 4.0 - 11.0 thou/uL   Procalcitonin   Result Value Ref Range    Procalcitonin 21.39 (H) 0.00 - 0.49 ng/mL   Culture, Blood   Result Value Ref Range    Anaerobic Blood Culture Bottle No Growth No Growth, No organisms seen, bottle returned to instrument, Specimen not received    Aerobic Blood Culture Bottle No Growth No Growth, No organisms seen, bottle returned to instrument, Specimen not received   Basic Metabolic Panel   Result Value Ref Range    Sodium 138 136 - 145 mmol/L    Potassium 4.5 3.5 - 5.0 mmol/L    Chloride 106 98 - 107 mmol/L    CO2 28 22 - 31 mmol/L    Anion Gap, Calculation 4 (L) 5 - 18 mmol/L    Glucose 89 70 - 125 mg/dL    Calcium 8.2 (L) 8.5 - 10.5 mg/dL    BUN 13 8 - 28 mg/dL    Creatinine 0.69 0.60 - 1.10 mg/dL    GFR MDRD Af Amer >60 >60 mL/min/1.73m2    GFR MDRD Non Af Amer >60 >60 mL/min/1.73m2   Hemoglobin   Result Value Ref Range    Hemoglobin 10.7 (L) 12.0 - 16.0 g/dL   White Blood Count (WBC)   Result Value Ref Range    WBC 8.5 4.0 - 11.0 thou/uL   Procalcitonin   Result Value Ref Range    Procalcitonin 17.07 (H) 0.00 - 0.49 ng/mL   POCT Glucose   Result Value Ref Range    Glucose,  mg/dL   Basic Metabolic Panel   Result Value Ref Range    Sodium 139 136 - 145 mmol/L    Potassium 4.0 3.5 - 5.0 mmol/L    Chloride 105 98 - 107 mmol/L    CO2 26 22 - 31 mmol/L    Anion Gap, Calculation 8 5 - 18 mmol/L    Glucose 89 70 - 125 mg/dL    Calcium 8.5 8.5 - 10.5 mg/dL    BUN 20 8 - 28 mg/dL    Creatinine 0.72 0.60 - 1.10 mg/dL    GFR MDRD Af Amer >60 >60 mL/min/1.73m2    GFR MDRD Non Af Amer >60 >60 mL/min/1.73m2   HM2(CBC w/o Differential)   Result Value Ref Range    WBC 7.3 4.0 - 11.0 thou/uL    RBC 3.54 (L) 3.80 - 5.40 mill/uL    Hemoglobin 10.4 (L) 12.0 - 16.0 g/dL    Hematocrit 31.7 (L) 35.0 - 47.0 %    MCV 90 80 - 100 fL    MCH 29.4 27.0 - 34.0 pg     MCHC 32.8 32.0 - 36.0 g/dL    RDW 14.1 11.0 - 14.5 %    Platelets 159 140 - 440 thou/uL    MPV 10.3 8.5 - 12.5 fL   Procalcitonin   Result Value Ref Range    Procalcitonin 10.48 (H) 0.00 - 0.49 ng/mL   Basic Metabolic Panel   Result Value Ref Range    Sodium 142 136 - 145 mmol/L    Potassium 3.9 3.5 - 5.0 mmol/L    Chloride 105 98 - 107 mmol/L    CO2 28 22 - 31 mmol/L    Anion Gap, Calculation 9 5 - 18 mmol/L    Glucose 94 70 - 125 mg/dL    Calcium 8.7 8.5 - 10.5 mg/dL    BUN 19 8 - 28 mg/dL    Creatinine 0.67 0.60 - 1.10 mg/dL    GFR MDRD Af Amer >60 >60 mL/min/1.73m2    GFR MDRD Non Af Amer >60 >60 mL/min/1.73m2   HM2(CBC w/o Differential)   Result Value Ref Range    WBC 7.3 4.0 - 11.0 thou/uL    RBC 3.42 (L) 3.80 - 5.40 mill/uL    Hemoglobin 10.2 (L) 12.0 - 16.0 g/dL    Hematocrit 30.8 (L) 35.0 - 47.0 %    MCV 90 80 - 100 fL    MCH 29.8 27.0 - 34.0 pg    MCHC 33.1 32.0 - 36.0 g/dL    RDW 14.3 11.0 - 14.5 %    Platelets 169 140 - 440 thou/uL    MPV 10.2 8.5 - 12.5 fL   Procalcitonin   Result Value Ref Range    Procalcitonin 4.80 (H) 0.00 - 0.49 ng/mL   POCT Glucose   Result Value Ref Range    Glucose, POC 80 mg/dL   Basic Metabolic Panel   Result Value Ref Range    Sodium 142 136 - 145 mmol/L    Potassium 3.8 3.5 - 5.0 mmol/L    Chloride 108 (H) 98 - 107 mmol/L    CO2 29 22 - 31 mmol/L    Anion Gap, Calculation 5 5 - 18 mmol/L    Glucose 84 70 - 125 mg/dL    Calcium 8.1 (L) 8.5 - 10.5 mg/dL    BUN 20 8 - 28 mg/dL    Creatinine 0.68 0.60 - 1.10 mg/dL    GFR MDRD Af Amer >60 >60 mL/min/1.73m2    GFR MDRD Non Af Amer >60 >60 mL/min/1.73m2   HM2(CBC w/o Differential)   Result Value Ref Range    WBC 6.7 4.0 - 11.0 thou/uL    RBC 3.16 (L) 3.80 - 5.40 mill/uL    Hemoglobin 9.4 (L) 12.0 - 16.0 g/dL    Hematocrit 28.7 (L) 35.0 - 47.0 %    MCV 91 80 - 100 fL    MCH 29.7 27.0 - 34.0 pg    MCHC 32.8 32.0 - 36.0 g/dL    RDW 14.3 11.0 - 14.5 %    Platelets 175 140 - 440 thou/uL    MPV 9.8 8.5 - 12.5 fL     Results for orders  placed or performed during the hospital encounter of 04/29/18   Basic Metabolic Panel   Result Value Ref Range    Sodium 142 136 - 145 mmol/L    Potassium 3.8 3.5 - 5.0 mmol/L    Chloride 108 (H) 98 - 107 mmol/L    CO2 29 22 - 31 mmol/L    Anion Gap, Calculation 5 5 - 18 mmol/L    Glucose 84 70 - 125 mg/dL    Calcium 8.1 (L) 8.5 - 10.5 mg/dL    BUN 20 8 - 28 mg/dL    Creatinine 0.68 0.60 - 1.10 mg/dL    GFR MDRD Af Amer >60 >60 mL/min/1.73m2    GFR MDRD Non Af Amer >60 >60 mL/min/1.73m2         Lab Results   Component Value Date    WBC 6.7 05/05/2018    HGB 9.4 (L) 05/05/2018    HCT 28.7 (L) 05/05/2018    MCV 91 05/05/2018     05/05/2018       No results found for: VUQRDYDB53  No results found for: HGBA1C  No results found for: INR, PROTIME  Vitamin D, Total (25-Hydroxy)   Date Value Ref Range Status   05/01/2017 36.0 30.0 - 80.0 ng/mL Final     Lab Results   Component Value Date    TSH 2.02 11/22/2017           ASSESSMENT/PLAN:    1. Left hip fracture, s/p bipolar hemiarthroplasty: Incision c/d/i. Pain controlled on tylenol TID. Also tylenol prn, tramadol prn. Per staff has not been using tramadol, will evaluate need for at next visit. ASA bid. F/u with ortho 2 weeks.   2. Aspiration pneumonia, dysphagia: NDD4, honey thickened liquids. ST following. IS q1h while awake. On augmentin until 5/11.   3. Parkinson's disease: Baseline tremor. On sinemet.   4. Depression, insomnia: On remeron.   5. Acute blood loss anemia: Hg 9.4 on 5/5. HM1 ordered 5/9.   6. Dementia: On aricept. Forgetful but a/o.   7. Constipation: on senna/docusate bid.     Per therapy transfers CGA, upper body ADLs set up, lower mod to max, toileting mod. Recommending 3 weeks    Electronically signed by: Toña Diallo NP    Total 45 minutes of which 75% was spent in counseling and coordination of care of the above plan    Time spent in interview and examination of patient, review of available records, and discussion with nursing staff.  Continue care plan, efforts at therapy, and monitor nutritional status.

## 2021-06-17 NOTE — ANESTHESIA CARE TRANSFER NOTE
Last vitals:   Vitals:    04/29/18 1900   BP: 141/84   Pulse: 79   Resp: 16   Temp: 37.2  C (98.9  F)   SpO2: 100%     Patient's level of consciousness is drowsy  Spontaneous respirations: yes  Maintains airway independently: yes  Dentition unchanged: yes  Oropharynx: oropharynx clear of all foreign objects    QCDR Measures:  ASA# 20 - Surgical Safety Checklist: WHO surgical safety checklist completed prior to induction  PQRS# 430 - Adult PONV Prevention: 4558F-8P - Pt did NOT receive => 2 anti-emetic agents  ASA# 8 - Peds PONV Prevention: NA - Not pediatric patient, not GA or 2 or more risk factors NOT present  PQRS# 424 - Mirian-op Temp Management: 4559F - At least one body temp DOCUMENTED => 35.5C or 95.9F within required timeframe  PQRS# 426 - PACU Transfer Protocol: - Transfer of care checklist used  ASA# 14 - Acute Post-op Pain: ASA14B - Patient did NOT experience pain >= 7 out of 10

## 2021-06-17 NOTE — PROGRESS NOTES
[facility]      Facility:    [facility] [[id number]]  Code Status: DNR/DNI      Chief Complaint/Reason for Visit:  Chief Complaint   Patient presents with     H & P       HPI:   Christen is a 89 y.o. female who fell and fractured her left hip requiring ORIF.  During the hospital stay she developed aspiration pneumonia in the right lower lobe.  Her underlying medical history is Parkinson's disease for which she takes Sinemet, dementia for which she is taking Aricept, and depression/anxiety/insomnia for which she is taking Remeron.    Upon current review of systems, she is not having fevers or chills, no nasal congestion or sore throat, cough is been improving and she is not feeling short of breath.  She does not have chest pain.  She does not have abdominal pain or nausea.  She does not have dysuria.    Past Medical History:  Past Medical History:   Diagnosis Date     Cataract     bilat     Parkinson disease            Surgical History:  Past Surgical History:   Procedure Laterality Date     BREAST BIOPSY Left     benign many years ago     CATARACT EXTRACTION, BILATERAL       EYE SURGERY       FRACTURE SURGERY       OOPHORECTOMY      Years ago     [place] PARTIAL HIP REPLACEMENT Left 4/29/2018    Procedure: HEMIARTHROPLASTY LEFT HIP- BIPOLAR;  Surgeon: Salvatore Erazo MD;  Location: [facility];  Service: Orthopedics     TONSILLECTOMY         Family History:   Family History   Problem Relation Age of Onset     Breast cancer Sister 61     Cancer Son 53       Social History:    Social History     Social History     Marital status:      Spouse name: N/A     Number of children: N/A     Years of education: N/A     Social History Main Topics     Smoking status: Former Smoker     Smokeless tobacco: Never Used     Alcohol use No     Drug use: No     Sexual activity: Not on file     Other Topics Concern     Not on file     Social History Narrative    Lives with her            Review of Systems   All other systems reviewed and are negative.      Vitals:    05/08/18 0658   BP: 133/63   Pulse: 74   Resp: 18   Temp: 98.7  F (37.1  C)   SpO2: 95%       Physical Exam   Constitutional: No distress.   HENT:   Mouth/Throat: Oropharynx is clear and moist.   Eyes: Right eye exhibits no discharge. Left eye exhibits no discharge.   Neck: No thyromegaly present.   Cardiovascular: Normal rate, regular rhythm and normal heart sounds.    Pulmonary/Chest: Effort normal and breath sounds normal. No respiratory distress. She has no wheezes. She has no rales.   Abdominal: Soft. Bowel sounds are normal. She exhibits no distension. There is no tenderness.   Musculoskeletal: She exhibits no edema.   Lymphadenopathy:     She has no cervical adenopathy.   Neurological: She is alert.   Skin: Skin is warm and dry.   Psychiatric: She has a normal mood and affect.   Nursing note and vitals reviewed.      Medication List:  Current Outpatient Prescriptions   Medication Sig     acetaminophen (TYLENOL) 500 MG tablet Take 2 tablets (1,000 mg total) by mouth 3 (three) times a day.     acetaminophen (TYLENOL) 500 MG tablet Take 500 mg by mouth every 12 (twelve) hours as needed for pain.     amoxicillin-clavulanate (AUGMENTIN) 875-125 mg per tablet Take 1 tablet by mouth 2 (two) times a day for 7 days. For RLL pneumonia     aspirin 325 MG tablet Take 1 tablet (325 mg total) by mouth 2 (two) times a day for 23 days.     carbidopa-levodopa (SINEMET CR)  mg ER tablet Take 1 tablet by mouth bedtime.     carbidopa-levodopa (SINEMET)  mg per tablet Take 1.5 tablets by mouth 3 (three) times a day. Take at 8am, 12pm, and 4pm     donepezil (ARICEPT) 10 MG tablet Take 10 mg by mouth at bedtime. Indications: Mild to Moderate Alzheimer's Type Dementia     mirtazapine (REMERON) 15 MG tablet Take 15 mg by mouth at bedtime. Indications: major depressive disorder     senna-docusate (PERICOLACE) 8.6-50 mg tablet Take 1  tablet by mouth 2 (two) times a day.     traMADol (ULTRAM) 50 mg tablet Take 0.5 tablets (25 mg total) by mouth every 6 (six) hours as needed for severe pain (7-10).       Labs:  No new laboratory testing    Assessment:    ICD-10-CM    1. Closed displaced fracture of left femoral neck S72.002A    2. Parkinson disease G20    3. Aspiration pneumonia of right lower lobe, unspecified aspiration pneumonia type J69.0    4. Dementia without behavioral disturbance, unspecified dementia type F03.90        Plan:  I reviewed the POLST form with her and her son was present at the same time.  In regards to the transitional care unit, she will be focused upon strengthening and stability of gait through therapies.      Electronically signed by: Juan Bautista MD

## 2021-06-17 NOTE — ANESTHESIA PROCEDURE NOTES
Spinal Block    Patient location during procedure: OR  Start time: 4/29/2018 5:16 PM  End time: 4/29/2018 5:20 PM  Reason for block: primary anesthetic    Staffing:  Performing  Anesthesiologist: ADALID MILES    Preanesthetic Checklist  Completed: patient identified, risks, benefits, and alternatives discussed, timeout performed, consent obtained, airway assessed, oxygen available, suction available, emergency drugs available and hand hygiene performed  Spinal Block  Patient position: sitting  Prep: ChloraPrep and site prepped and draped  Patient monitoring: heart rate, continuous pulse ox and blood pressure  Approach: midline  Location: L3-4  Injection technique: single-shot  Needle type: pencil-tip   Needle gauge: 25 G

## 2021-06-17 NOTE — ANESTHESIA POSTPROCEDURE EVALUATION
Patient: Christen Loya  HEMIARTHROPLASTY LEFT HIP- BIPOLAR  Anesthesia type: spinal    Patient location: PACU  Last vitals:   Vitals:    04/29/18 1929   BP: 141/69   Pulse: 80   Resp: 12   Temp: 37.1  C (98.7  F)   SpO2: 98%     Post vital signs: stable  Level of consciousness: awake and responds to simple questions  Post-anesthesia pain: pain controlled  Post-anesthesia nausea and vomiting: no  Pulmonary: unassisted, return to baseline  Cardiovascular: stable and blood pressure at baseline  Hydration: adequate  Anesthetic events: no    QCDR Measures:  ASA# 11 - Mirian-op Cardiac Arrest: ASA11B - Patient did NOT experience unanticipated cardiac arrest  ASA# 12 - Mirian-op Mortality Rate: ASA12B - Patient did NOT die  ASA# 13 - PACU Re-Intubation Rate: NA - No ETT / LMA used for case  ASA# 10 - Composite Anes Safety: ASA10A - No serious adverse event    Additional Notes:

## 2021-06-17 NOTE — ANESTHESIA PREPROCEDURE EVALUATION
Anesthesia Evaluation      Patient summary reviewed   No history of anesthetic complications     Airway   Mallampati: I  Neck ROM: full   Pulmonary - normal exam    breath sounds clear to auscultation  (+) a smoker                         Cardiovascular - negative ROS and normal exam  (-) murmur  ECG reviewed  Rhythm: regular  Rate: normal,    no murmur      Neuro/Psych    (+) Parkinson's disease,     Endo/Other       Comments: Bullous pemphigoid      GI/Hepatic/Renal - negative ROS           Dental - normal exam                        Anesthesia Plan  Planned anesthetic: spinal    ASA 3     Anesthetic plan and risks discussed with: patient and child/children    Post-op plan: routine recovery

## 2021-06-17 NOTE — PROGRESS NOTES
[facility]    DATE: 5/10/2018    NAME:  Christen Loya             :  10/6/1928  MRN: [id number]  CODE STATUS:  FULL CODE    VISIT TYPE: Review Of Multiple Medical Conditions     FACILITY:  [facility] [[id number]]       CHIEF COMPLAIN/REASON FOR VISIT:    Chief Complaint   Patient presents with     Review Of Multiple Medical Conditions               HISTORY OF PRESENT ILLNESS: Christen Loya is a 89 y.o. female who was admitted - for fall, Left hip fracture. Postop she had lethargy with pain meds as well as aspiration pneumonia and treated with levaquin then later zosyn and augmentin. ST evaluated and was changed to thickened liquids. She also had urinary retention and otto was removed but required straight cath as needed. She was discharged on bladder scan twice daily. Her oxycodone was discontinued and started on tramadol and tylenol. She developed loose stools and c diff was sent. She has PMH of Parkinson's disease, depression, anxiety, insomnia. Prior to this she lived at home in a house with her  and son.     Today Ms. Loya states she is doing well today and not having any pain. She says her hip is sore when she walks, otherwise it does not bother her much. She had a bowel movement this morning after eating a good breakfast. She says she thinks therapy is going fine. She says she is not dizzy or having any lightheadedness. She has no nausea or vomiting problems. Per staff no loose stools, formed bowel movements. No recent concerns and not asking for tramadol at all. She has had tylenol only couple of times. Her labs are available from  and vitals are stable. She has an appt with ortho on .     REVIEW OF SYSTEMS:  PROBLEMS AND REVIEW OF SYSTEMS:   Review of Systems  Today on ROS:   Currently, no fever, chills, or rigors. Decreased vision and hearing. Denies any chest pain, headaches, palpitations, lightheadedness, dizziness,  shortness of breath, or cough. Appetite is good. Denies any GERD symptoms. Denies any difficulty with swallowing, nausea, or vomiting.  Denies any abdominal pain, diarrhea or constipation. Denies any urinary symptoms. No insomnia. No active bleeding. No rash. Positive for forgetfulness, fatigue, baseline tremor, dysphagia, hip incision      Allergies   Allergen Reactions     Demerol [Meperidine]      Sulfa (Sulfonamide Antibiotics)      Current Outpatient Prescriptions   Medication Sig     acetaminophen (TYLENOL) 500 MG tablet Take 2 tablets (1,000 mg total) by mouth 3 (three) times a day.     acetaminophen (TYLENOL) 500 MG tablet Take 500 mg by mouth every 12 (twelve) hours as needed for pain.     amoxicillin-clavulanate (AUGMENTIN) 875-125 mg per tablet Take 1 tablet by mouth 2 (two) times a day for 7 days. For RLL pneumonia     aspirin 325 MG tablet Take 1 tablet (325 mg total) by mouth 2 (two) times a day for 23 days.     carbidopa-levodopa (SINEMET CR)  mg ER tablet Take 1 tablet by mouth bedtime.     carbidopa-levodopa (SINEMET)  mg per tablet Take 1.5 tablets by mouth 3 (three) times a day. Take at 8am, 12pm, and 4pm     donepezil (ARICEPT) 10 MG tablet Take 10 mg by mouth at bedtime. Indications: Mild to Moderate Alzheimer's Type Dementia     mirtazapine (REMERON) 15 MG tablet Take 15 mg by mouth at bedtime. Indications: major depressive disorder     senna-docusate (PERICOLACE) 8.6-50 mg tablet Take 1 tablet by mouth 2 (two) times a day.     Past Medical History:    Past Medical History:   Diagnosis Date     Cataract     bilat     Parkinson disease            PHYSICAL EXAMINATION  Vitals:    05/09/18 2233   BP: 152/64   Pulse: 78   Resp: 16   Temp: 97  F (36.1  C)   SpO2: 94%       Today on physical exam:     GENERAL: Awake, Alert, oriented x3, not in any form of acute distress, answers questions appropriately, follows simple commands, conversant  HEENT: Head is normocephalic with normal hair  distribution. No evidence of trauma. Ears: No acute purulent discharge. Eyes: Conjunctivae pink with no scleral jaundice. Nose: Normal mucosa and septum. NECK: Supple with no cervical or supraclavicular lymphadenopathy. Trachea is midline.   CHEST: No tenderness or deformity, no crepitus  LUNG: Dim to auscultation with good chest expansion. There are no crackles or wheezes, normal AP diameter.  BACK: No kyphosis of the thoracic spine. Symmetric, no curvature, ROM normal, no CVA tenderness, no spinal tenderness   CVS: There is good S1  S2, regular rhythm, there are no murmurs, rubs, gallops, or heaves,  2+ pulses symmetric in all extremities.  ABDOMEN: Rounded and soft, nontender to palpation, non distended, no masses, no organomegaly, good bowel sounds, no rebound or guarding, no peritoneal signs.   EXTREMITIES: 1+ lle pedal edema, Left hip incision c/d/i  SKIN: Warm and dry, no erythema noted.  Skin color, texture, no rashes or lesions.  NEUROLOGICAL: The patient is oriented to person, place and time, forgetful. Weakness, fatigue, baseline tremor, shuffled gait            LABS:   Recent Results (from the past 168 hour(s))   Basic Metabolic Panel   Result Value Ref Range    Sodium 142 136 - 145 mmol/L    Potassium 3.9 3.5 - 5.0 mmol/L    Chloride 105 98 - 107 mmol/L    CO2 28 22 - 31 mmol/L    Anion Gap, Calculation 9 5 - 18 mmol/L    Glucose 94 70 - 125 mg/dL    Calcium 8.7 8.5 - 10.5 mg/dL    BUN 19 8 - 28 mg/dL    Creatinine 0.67 0.60 - 1.10 mg/dL    GFR MDRD Af Amer >60 >60 mL/min/1.73m2    GFR MDRD Non Af Amer >60 >60 mL/min/1.73m2   HM2(CBC w/o Differential)   Result Value Ref Range    WBC 7.3 4.0 - 11.0 thou/uL    RBC 3.42 (L) 3.80 - 5.40 mill/uL    Hemoglobin 10.2 (L) 12.0 - 16.0 g/dL    Hematocrit 30.8 (L) 35.0 - 47.0 %    MCV 90 80 - 100 fL    MCH 29.8 27.0 - 34.0 pg    MCHC 33.1 32.0 - 36.0 g/dL    RDW 14.3 11.0 - 14.5 %    Platelets 169 140 - 440 thou/uL    MPV 10.2 8.5 - 12.5 fL   Procalcitonin   Result  Value Ref Range    Procalcitonin 4.80 (H) 0.00 - 0.49 ng/mL   POCT Glucose   Result Value Ref Range    Glucose, POC 80 mg/dL   Basic Metabolic Panel   Result Value Ref Range    Sodium 142 136 - 145 mmol/L    Potassium 3.8 3.5 - 5.0 mmol/L    Chloride 108 (H) 98 - 107 mmol/L    CO2 29 22 - 31 mmol/L    Anion Gap, Calculation 5 5 - 18 mmol/L    Glucose 84 70 - 125 mg/dL    Calcium 8.1 (L) 8.5 - 10.5 mg/dL    BUN 20 8 - 28 mg/dL    Creatinine 0.68 0.60 - 1.10 mg/dL    GFR MDRD Af Amer >60 >60 mL/min/1.73m2    GFR MDRD Non Af Amer >60 >60 mL/min/1.73m2   HM2(CBC w/o Differential)   Result Value Ref Range    WBC 6.7 4.0 - 11.0 thou/uL    RBC 3.16 (L) 3.80 - 5.40 mill/uL    Hemoglobin 9.4 (L) 12.0 - 16.0 g/dL    Hematocrit 28.7 (L) 35.0 - 47.0 %    MCV 91 80 - 100 fL    MCH 29.7 27.0 - 34.0 pg    MCHC 32.8 32.0 - 36.0 g/dL    RDW 14.3 11.0 - 14.5 %    Platelets 175 140 - 440 thou/uL    MPV 9.8 8.5 - 12.5 fL   Basic Metabolic Panel   Result Value Ref Range    Sodium 145 136 - 145 mmol/L    Potassium 3.8 3.5 - 5.0 mmol/L    Chloride 111 (H) 98 - 107 mmol/L    CO2 29 22 - 31 mmol/L    Anion Gap, Calculation 5 5 - 18 mmol/L    Glucose 98 70 - 125 mg/dL    Calcium 8.9 8.5 - 10.5 mg/dL    BUN 25 8 - 28 mg/dL    Creatinine 0.67 0.60 - 1.10 mg/dL    GFR MDRD Af Amer >60 >60 mL/min/1.73m2    GFR MDRD Non Af Amer >60 >60 mL/min/1.73m2   HM1 (CBC with Diff)   Result Value Ref Range    WBC 7.8 4.0 - 11.0 thou/uL    RBC 3.25 (L) 3.80 - 5.40 mill/uL    Hemoglobin 9.7 (L) 12.0 - 16.0 g/dL    Hematocrit 30.0 (L) 35.0 - 47.0 %    MCV 92 80 - 100 fL    MCH 29.8 27.0 - 34.0 pg    MCHC 32.3 32.0 - 36.0 g/dL    RDW 14.7 (H) 11.0 - 14.5 %    Platelets 348 140 - 440 thou/uL    MPV 9.7 8.5 - 12.5 fL    Neutrophils % 72 (H) 50 - 70 %    Lymphocytes % 18 (L) 20 - 40 %    Monocytes % 7 2 - 10 %    Eosinophils % 2 0 - 6 %    Basophils % 1 0 - 2 %    Neutrophils Absolute 5.6 2.0 - 7.7 thou/uL    Lymphocytes Absolute 1.4 0.8 - 4.4 thou/uL    Monocytes  Absolute 0.6 0.0 - 0.9 thou/uL    Eosinophils Absolute 0.2 0.0 - 0.4 thou/uL    Basophils Absolute 0.0 0.0 - 0.2 thou/uL     Results for orders placed or performed in visit on 05/09/18   Basic Metabolic Panel   Result Value Ref Range    Sodium 145 136 - 145 mmol/L    Potassium 3.8 3.5 - 5.0 mmol/L    Chloride 111 (H) 98 - 107 mmol/L    CO2 29 22 - 31 mmol/L    Anion Gap, Calculation 5 5 - 18 mmol/L    Glucose 98 70 - 125 mg/dL    Calcium 8.9 8.5 - 10.5 mg/dL    BUN 25 8 - 28 mg/dL    Creatinine 0.67 0.60 - 1.10 mg/dL    GFR MDRD Af Amer >60 >60 mL/min/1.73m2    GFR MDRD Non Af Amer >60 >60 mL/min/1.73m2         Lab Results   Component Value Date    WBC 7.8 05/09/2018    HGB 9.7 (L) 05/09/2018    HCT 30.0 (L) 05/09/2018    MCV 92 05/09/2018     05/09/2018       No results found for: TPYYOGZK66  No results found for: HGBA1C  No results found for: INR, PROTIME  Vitamin D, Total (25-Hydroxy)   Date Value Ref Range Status   05/01/2017 36.0 30.0 - 80.0 ng/mL Final     Lab Results   Component Value Date    TSH 2.02 11/22/2017           ASSESSMENT/PLAN:    1. Left hip fracture, s/p bipolar hemiarthroplasty: Incision c/d/i. Pain controlled on tylenol TID. Also tylenol prn. ASA bid. F/u with ortho 5/17. DC tramadol as not using.    2. Aspiration pneumonia, dysphagia: NDD4, honey thickened liquids. ST following. IS q1h while awake. On augmentin until 5/11.   3. Parkinson's disease: Baseline tremor. On sinemet.   4. Depression, insomnia: On remeron.   5. Acute blood loss anemia: Hg 9.4 on 5/5. HM1 ordered 5/9-9.7.   6. Dementia: On aricept. Forgetful but a/o.   7. Constipation: on senna/docusate bid.     Per therapy transfers CGA, upper body ADLs set up, lower mod to max, toileting mod. Recommending 3 weeks    Electronically signed by: Toña Diallo NP    Total 25 minutes of which 75% was spent in counseling and coordination of care of the above plan    Time spent in interview and examination of patient, review of  available records, and discussion with nursing staff. Continue care plan, efforts at therapy, and monitor nutritional status.

## 2021-06-18 NOTE — PROGRESS NOTES
[facility]    Facility:   [facility] [[id number]]   Code Status: DNR/DNI      CHIEF COMPLAINT/REASON FOR VISIT:  Chief Complaint   Patient presents with     Review Of Multiple Medical Conditions       HISTORY:      HPI: Christen is a 89 y.o. female who fell and fractured her left hip requiring ORIF.  During the hospital stay she developed aspiration pneumonia in the right lower lobe.  Her underlying medical history is Parkinson's disease for which she takes Sinemet, dementia for which she is taking Aricept, and depression/anxiety/insomnia for which she is taking Remeron.    Upon current review of systems, she is not having any new problems and feels that things are going well.  Upon review with staff it appears that the assessment is one of recommended long-term care with 24-hour supervision with all mobility and self-cares.    Past Medical History:   Diagnosis Date     Cataract     bilat     Parkinson disease              Family History   Problem Relation Age of Onset     Breast cancer Sister 61     Cancer Son 53     Social History     Social History     Marital status:      Spouse name: N/A     Number of children: N/A     Years of education: N/A     Social History Main Topics     Smoking status: Former Smoker     Smokeless tobacco: Never Used     Alcohol use No     Drug use: No     Sexual activity: Not on file     Other Topics Concern     Not on file     Social History Narrative    Lives with her          Review of Systems   Constitutional: Negative for chills and fever.   HENT: Negative for congestion.    Respiratory: Negative for cough and shortness of breath.    Cardiovascular: Negative for chest pain.   Gastrointestinal: Negative for abdominal pain.   Genitourinary: Negative for dysuria.       .  Vitals:    05/29/18 1451   BP: 176/81   Pulse: 81   Resp: 21   Temp: 97.7  F (36.5  C)   SpO2: 90%   Weight: (!) 92 lb 12.8 oz (42.1 kg)       Physical Exam    Constitutional: No distress.   Eyes: Right eye exhibits no discharge. Left eye exhibits no discharge.   Cardiovascular: Normal heart sounds.    Pulmonary/Chest: Breath sounds normal. No respiratory distress.   Abdominal: She exhibits no distension. There is no tenderness.   Musculoskeletal: She exhibits no edema.   Neurological: She is alert.   Psychiatric: She has a normal mood and affect.   Nursing note and vitals reviewed.        LABS:   No new laboratory testing    ASSESSMENT:      ICD-10-CM    1. Closed displaced fracture of left femoral neck S72.002A    2. Parkinson disease G20    3. Dementia without behavioral disturbance, unspecified dementia type F03.90    4. Aspiration pneumonia of right lower lobe, unspecified aspiration pneumonia type J69.0        PLAN:    Continue with current plans of therapy and monitoring of medical conditions.    Electronically signed by: Juan Bautista MD

## 2021-06-18 NOTE — PROGRESS NOTES
[facility]    Facility:   [facility] [[id number]]   Code Status: DNR/DNI  PCP: Dioni Mejía MD   Phone: [phone]   Fax: [phone]      CHIEF COMPLAINT/REASON FOR VISIT:  Chief Complaint   Patient presents with     Discharge Summary       HISTORY COURSE:  Christen is a 89 y.o. female who fell and fractured her left hip requiring ORIF.  During the hospital stay she developed aspiration pneumonia in the right lower lobe.  Her underlying medical history is Parkinson's disease for which she takes Sinemet, dementia for which she is taking Aricept, and depression/anxiety/insomnia for which she is taking Remeron.    During her time the transitional care unit, she had steady progression of strength and stability.  Plans at the time of discharge are to return to home with the assistance of her son and her daughter.    Review of Systems   Constitutional: Negative for chills and fever.   HENT: Negative for congestion.    Respiratory: Negative for cough and shortness of breath.    Cardiovascular: Negative for chest pain.   Gastrointestinal: Negative for abdominal pain and nausea.   Genitourinary: Negative for dysuria.       Vitals:    06/05/18 1914   BP: 124/62   Pulse: 85   Resp: 17   Temp: 97.8  F (36.6  C)   SpO2: 96%       Physical Exam   Constitutional: No distress.   Eyes: Right eye exhibits no discharge. Left eye exhibits no discharge.   Cardiovascular: Normal heart sounds.    Pulmonary/Chest: Breath sounds normal. No respiratory distress.   Abdominal: She exhibits no distension. There is no tenderness.   Musculoskeletal: She exhibits no edema.   Neurological: She is alert.   Psychiatric: She has a normal mood and affect.   Nursing note and vitals reviewed.      MEDICATION LIST:  Current Outpatient Prescriptions   Medication Sig     acetaminophen (TYLENOL) 500 MG tablet Take 2 tablets (1,000 mg total) by mouth 3 (three) times a day.     acetaminophen (TYLENOL) 500 MG  tablet Take 500 mg by mouth every 12 (twelve) hours as needed for pain.     carbidopa-levodopa (SINEMET CR)  mg ER tablet Take 1 tablet by mouth bedtime.     carbidopa-levodopa (SINEMET)  mg per tablet Take 1.5 tablets by mouth 3 (three) times a day. Take at 8am, 12pm, and 4pm     donepezil (ARICEPT) 10 MG tablet Take 10 mg by mouth at bedtime. Indications: Mild to Moderate Alzheimer's Type Dementia     mirtazapine (REMERON) 15 MG tablet Take 15 mg by mouth at bedtime. Indications: major depressive disorder     senna-docusate (PERICOLACE) 8.6-50 mg tablet Take 1 tablet by mouth 2 (two) times a day.       DISCHARGE DIAGNOSIS:    ICD-10-CM    1. Closed displaced fracture of left femoral neck S72.002A    2. Parkinson disease G20    3. Aspiration pneumonia of right lower lobe, unspecified aspiration pneumonia type J69.0        MEDICAL EQUIPMENT NEEDS:  No specialized medical equipment    DISCHARGE PLAN/FACE TO FACE:  I certify that services are/were furnished while this patient was under the care of a physician and that a physician or an allowed non-physician practitioner (NPP), had a face-to-face encounter that meets the physician face-to-face encounter requirements. The encounter was in whole, or in part, related to the primary reason for home health. The patient is confined to his/her home and needs intermittent skilled nursing, physical therapy, speech-language pathology, or the continued need for occupational therapy. A plan of care has been established by a physician and is periodically reviewed by a physician.  Date of Face-to-Face Encounter: 6/5/18    I certify that, based on my findings, the following services are medically necessary home health services: Home physical therapy and home occupational therapy    My clinical findings support the need for the above skilled services because: (Please write a brief narrative summary that describes what the RN, PT, SLP, or other services will be doing in the  home. A list of diagnoses in this section does not meet the CMS requirements.)  Home physical therapy is to evaluate and treat for strengthening, balance, endurance, and safety with mobility and ambulation.  Home occupational therapy is to evaluate and treat for strengthening, ADL needs, adaptive equipment and safety.    This patient is homebound because: (Please write a brief narrative summary describing the functional limitations as to why this patient is homebound and specifically what makes this patient homebound.)  She still is frail and unsteady with gait to the point that she is homebound.  Also her strength and endurance results in the homebound status.    The patient is, or has been, under my care and I have initiated the establishment of the plan of care. This patient will be followed by a physician who will periodically review the plan of care.    Schedule follow up visit with primary care provider within 7 days to reestablish care.    Electronically signed by: Juan Bautista MD

## 2021-06-18 NOTE — PROGRESS NOTES
[facility]    DATE: 2018    NAME:  Christen Loya             :  10/6/1928  MRN: [id number]  CODE STATUS:  FULL CODE    VISIT TYPE: Review Of Multiple Medical Conditions     FACILITY:  [facility] [[id number]]       CHIEF COMPLAIN/REASON FOR VISIT:    Chief Complaint   Patient presents with     Review Of Multiple Medical Conditions               HISTORY OF PRESENT ILLNESS: Christen Loya is a 89 y.o. female who was admitted - for fall, Left hip fracture. Postop she had lethargy with pain meds as well as aspiration pneumonia and treated with levaquin then later zosyn and augmentin. ST evaluated and was changed to thickened liquids. She also had urinary retention and otto was removed but required straight cath as needed. She was discharged on bladder scan twice daily. Her oxycodone was discontinued and started on tramadol and tylenol. She developed loose stools and c diff was sent. She has PMH of Parkinson's disease, depression, anxiety, insomnia. Prior to this she lived at home in a house with her  and son.     Today Ms. Loya states she is fine today and is seen laying in bed. She says she prefers to lay in bed than to sit up in her chairs as they are uncomfortable. She is doing well in therapy and feels like she is getting stronger. She says she sleeps just fine and no issues with her bowels recently. She reports her appetite is adequate and has had no pain recently. She does not recall asking for tylenol at all in the past few days. She does fell cold and would like a blanket but denies fevers. Per nursing her incision is healing well and has an appt with ortho next on .     REVIEW OF SYSTEMS:  PROBLEMS AND REVIEW OF SYSTEMS:   Review of Systems  Today on ROS:   Currently, no fever, chills, or rigors. Decreased vision and hearing. Denies any chest pain, headaches, palpitations, lightheadedness, dizziness, shortness of breath, or  cough. Appetite is good. Denies any GERD symptoms. Denies any difficulty with swallowing, nausea, or vomiting.  Denies any abdominal pain, diarrhea or constipation. Denies any urinary symptoms. No insomnia. No active bleeding. No rash. Positive for forgetfulness, baseline tremor, dysphagia-improved, hip incision-healing, no pain recently      Allergies   Allergen Reactions     Demerol [Meperidine]      Sulfa (Sulfonamide Antibiotics)      Current Outpatient Prescriptions   Medication Sig     acetaminophen (TYLENOL) 500 MG tablet Take 2 tablets (1,000 mg total) by mouth 3 (three) times a day.     acetaminophen (TYLENOL) 500 MG tablet Take 500 mg by mouth every 12 (twelve) hours as needed for pain.     aspirin 325 MG tablet Take 1 tablet (325 mg total) by mouth 2 (two) times a day for 23 days.     carbidopa-levodopa (SINEMET CR)  mg ER tablet Take 1 tablet by mouth bedtime.     carbidopa-levodopa (SINEMET)  mg per tablet Take 1.5 tablets by mouth 3 (three) times a day. Take at 8am, 12pm, and 4pm     donepezil (ARICEPT) 10 MG tablet Take 10 mg by mouth at bedtime. Indications: Mild to Moderate Alzheimer's Type Dementia     mirtazapine (REMERON) 15 MG tablet Take 15 mg by mouth at bedtime. Indications: major depressive disorder     senna-docusate (PERICOLACE) 8.6-50 mg tablet Take 1 tablet by mouth 2 (two) times a day.     Past Medical History:    Past Medical History:   Diagnosis Date     Cataract     bilat     Parkinson disease            PHYSICAL EXAMINATION  Vitals:    05/23/18 2130   BP: 129/60   Pulse: 83   Resp: 16   Temp: 98.5  F (36.9  C)   SpO2: 98%       Today on physical exam:     GENERAL: Awake, Alert, oriented x3, not in any form of acute distress, answers questions appropriately, follows simple commands, conversant  HEENT: Head is normocephalic with normal hair distribution. No evidence of trauma. Ears: No acute purulent discharge. Eyes: Conjunctivae pink with no scleral jaundice. Nose: Normal  mucosa and septum. NECK: Supple with no cervical or supraclavicular lymphadenopathy. Trachea is midline.   CHEST: No tenderness or deformity, no crepitus  LUNG: Dim to auscultation with good chest expansion. There are no crackles or wheezes, normal AP diameter.  BACK: No kyphosis of the thoracic spine. Symmetric, no curvature, ROM normal, no CVA tenderness, no spinal tenderness   CVS: There is good S1  S2, regular rhythm, there are no murmurs, rubs, gallops, or heaves,  2+ pulses symmetric in all extremities.  ABDOMEN: Rounded and soft, nontender to palpation, non distended, no masses, no organomegaly, good bowel sounds, no rebound or guarding, no peritoneal signs.   EXTREMITIES: no pedal edema, Left hip incision healing  SKIN: Warm and dry, no erythema noted.  Skin color, texture, no rashes or lesions.  NEUROLOGICAL: The patient is oriented to person, place and time, forgetful. Weakness, fatigue, baseline tremor, shuffled gait            LABS:   Recent Results (from the past 168 hour(s))   HM1 (CBC with Diff)   Result Value Ref Range    WBC 6.2 4.0 - 11.0 thou/uL    RBC 3.89 3.80 - 5.40 mill/uL    Hemoglobin 11.3 (L) 12.0 - 16.0 g/dL    Hematocrit 37.3 35.0 - 47.0 %    MCV 96 80 - 100 fL    MCH 29.0 27.0 - 34.0 pg    MCHC 30.3 (L) 32.0 - 36.0 g/dL    RDW 15.9 (H) 11.0 - 14.5 %    Platelets 320 140 - 440 thou/uL    MPV 9.9 8.5 - 12.5 fL    Neutrophils % 74 (H) 50 - 70 %    Lymphocytes % 16 (L) 20 - 40 %    Monocytes % 5 2 - 10 %    Eosinophils % 3 0 - 6 %    Basophils % 1 0 - 2 %    Neutrophils Absolute 4.6 2.0 - 7.7 thou/uL    Lymphocytes Absolute 1.0 0.8 - 4.4 thou/uL    Monocytes Absolute 0.3 0.0 - 0.9 thou/uL    Eosinophils Absolute 0.2 0.0 - 0.4 thou/uL    Basophils Absolute 0.1 0.0 - 0.2 thou/uL     Results for orders placed or performed in visit on 05/09/18   Basic Metabolic Panel   Result Value Ref Range    Sodium 145 136 - 145 mmol/L    Potassium 3.8 3.5 - 5.0 mmol/L    Chloride 111 (H) 98 - 107 mmol/L     CO2 29 22 - 31 mmol/L    Anion Gap, Calculation 5 5 - 18 mmol/L    Glucose 98 70 - 125 mg/dL    Calcium 8.9 8.5 - 10.5 mg/dL    BUN 25 8 - 28 mg/dL    Creatinine 0.67 0.60 - 1.10 mg/dL    GFR MDRD Af Amer >60 >60 mL/min/1.73m2    GFR MDRD Non Af Amer >60 >60 mL/min/1.73m2         Lab Results   Component Value Date    WBC 6.2 05/21/2018    HGB 11.3 (L) 05/21/2018    HCT 37.3 05/21/2018    MCV 96 05/21/2018     05/21/2018       No results found for: RAGQGCWI49  No results found for: HGBA1C  No results found for: INR, PROTIME  Vitamin D, Total (25-Hydroxy)   Date Value Ref Range Status   05/01/2017 36.0 30.0 - 80.0 ng/mL Final     Lab Results   Component Value Date    TSH 2.02 11/22/2017           ASSESSMENT/PLAN:    1. Left hip fracture, s/p bipolar hemiarthroplasty: Incision healing. ASA bid. F/u with ortho 5/17-doing well, xrays stable, f/u 4 weeks on 6/14.Tylenol prn, no pain recently.   2. Aspiration pneumonia, dysphagia: NDD4, honey thickened liquids-now on thin liquids. ST signed off on 5/18. IS q1h while awake. Completed augmentin 5/11. Doing well on thin liquids, encouraging chin tuck.   3. Parkinson's disease: Baseline tremor. On sinemet.   4. Depression, insomnia: On remeron.   5. Acute blood loss anemia: Hg 9.4 on 5/5. HM1 ordered 5/9-9.7. Repeat HM1 on 5/21-Hg now 11. Resolved.   6. Dementia: On aricept. Forgetful but a/o.   7. Constipation: on senna/docusate bid.     Per therapy ambulating 150 ft with walker, CGA, transfers SBA, bed mobility min, upper body set up, lower body min, toileting min to mod. Recommending LCD 5/18 for ST, 2 weeks for PT, OT    Electronically signed by: Toña Diallo NP    Total 25 minutes of which 75% was spent in counseling and coordination of care of the above plan    Time spent in interview and examination of patient, review of available records, and discussion with nursing staff. Continue care plan, efforts at therapy, and monitor nutritional  status.

## 2021-06-18 NOTE — PROGRESS NOTES
[facility]    Facility:   [facility] [[id number]]   Code Status: DNR/DNI      CHIEF COMPLAINT/REASON FOR VISIT:  Chief Complaint   Patient presents with     Review Of Multiple Medical Conditions       HISTORY:      HPI: Christen is a 89 y.o. female who fell and fractured her left hip requiring ORIF.  During the hospital stay she developed aspiration pneumonia in the right lower lobe.  Her underlying medical history is Parkinson's disease for which she takes Sinemet, dementia for which she is taking Aricept, and depression/anxiety/insomnia for which she is taking Remeron.    Upon current review of systems, she was having some loose stools so she cut back on medication for constipation and bowels have been okay.  She feels that the therapy is going well.        Past Medical History:   Diagnosis Date     Cataract     bilat     Parkinson disease              Family History   Problem Relation Age of Onset     Breast cancer Sister 61     Cancer Son 53     Social History     Social History     Marital status:      Spouse name: N/A     Number of children: N/A     Years of education: N/A     Social History Main Topics     Smoking status: Former Smoker     Smokeless tobacco: Never Used     Alcohol use No     Drug use: No     Sexual activity: Not on file     Other Topics Concern     Not on file     Social History Narrative    Lives with her          Review of Systems   Constitutional: Negative for chills and fever.   HENT: Negative for congestion and sore throat.    Respiratory: Negative for cough and shortness of breath.    Cardiovascular: Negative for chest pain.   Gastrointestinal: Negative for abdominal pain and nausea.   Genitourinary: Negative for dysuria.       .  Vitals:    05/15/18 1209   BP: 127/60   Pulse: 83   Resp: 19   Temp: 98.9  F (37.2  C)   SpO2: 96%       Physical Exam   Constitutional: No distress.   Eyes: Right eye exhibits no discharge. Left eye  exhibits no discharge.   Cardiovascular: Normal rate, regular rhythm and normal heart sounds.    Pulmonary/Chest: Breath sounds normal. No respiratory distress.   Abdominal: Bowel sounds are normal. She exhibits no distension. There is no tenderness.   Musculoskeletal: She exhibits no edema.   Neurological: She is alert.   Skin: Skin is warm and dry.   Psychiatric: She has a normal mood and affect.   Nursing note and vitals reviewed.        LABS:   No new laboratory testing    ASSESSMENT:      ICD-10-CM    1. Closed displaced fracture of left femoral neck S72.002A    2. S/P hip hemiarthroplasty Z96.649    3. Parkinson disease G20    4. Dementia without behavioral disturbance, unspecified dementia type F03.90        PLAN:    Continue with therapies as planned.  Continue to monitor medical conditions.      Electronically signed by: Juan Bautista MD

## 2021-06-18 NOTE — PROGRESS NOTES
[facility]    DATE: 2018    NAME:  Christen Loya             :  10/6/1928  MRN: [id number]  CODE STATUS:  FULL CODE    VISIT TYPE: Review Of Multiple Medical Conditions     FACILITY:  [facility] [[id number]]       CHIEF COMPLAIN/REASON FOR VISIT:    Chief Complaint   Patient presents with     Review Of Multiple Medical Conditions               HISTORY OF PRESENT ILLNESS: Christen Loya is a 89 y.o. female who was admitted - for fall, Left hip fracture. Postop she had lethargy with pain meds as well as aspiration pneumonia and treated with levaquin then later zosyn and augmentin. ST evaluated and was changed to thickened liquids. She also had urinary retention and otto was removed but required straight cath as needed. She was discharged on bladder scan twice daily. Her oxycodone was discontinued and started on tramadol and tylenol. She developed loose stools and c diff was sent. She has PMH of Parkinson's disease, depression, anxiety, insomnia. Prior to this she lived at home in a house with her  and son.     Today Ms. Loya states she is doing fine today just tired. She denies pain at this time and says she just needs to rest. She thinks her bowels are moving fine and no issues with her appetite. She denies other concerns. She has an appt with ortho on . Per staff her vitals have been controlled and not complaining of much pain. Her lab results from last week are available for review. No acute concerns per staff.     REVIEW OF SYSTEMS:  PROBLEMS AND REVIEW OF SYSTEMS:   Review of Systems  Today on ROS:   Currently, no fever, chills, or rigors. Decreased vision and hearing. Denies any chest pain, headaches, palpitations, lightheadedness, dizziness, shortness of breath, or cough. Appetite is good. Denies any GERD symptoms. Denies any difficulty with swallowing, nausea, or vomiting.  Denies any abdominal pain, diarrhea or constipation.  Denies any urinary symptoms. No insomnia. No active bleeding. No rash. Positive for forgetfulness, fatigue, baseline tremor, dysphagia, hip incision      Allergies   Allergen Reactions     Demerol [Meperidine]      Sulfa (Sulfonamide Antibiotics)      Current Outpatient Prescriptions   Medication Sig     acetaminophen (TYLENOL) 500 MG tablet Take 2 tablets (1,000 mg total) by mouth 3 (three) times a day.     acetaminophen (TYLENOL) 500 MG tablet Take 500 mg by mouth every 12 (twelve) hours as needed for pain.     aspirin 325 MG tablet Take 1 tablet (325 mg total) by mouth 2 (two) times a day for 23 days.     carbidopa-levodopa (SINEMET CR)  mg ER tablet Take 1 tablet by mouth bedtime.     carbidopa-levodopa (SINEMET)  mg per tablet Take 1.5 tablets by mouth 3 (three) times a day. Take at 8am, 12pm, and 4pm     donepezil (ARICEPT) 10 MG tablet Take 10 mg by mouth at bedtime. Indications: Mild to Moderate Alzheimer's Type Dementia     mirtazapine (REMERON) 15 MG tablet Take 15 mg by mouth at bedtime. Indications: major depressive disorder     senna-docusate (PERICOLACE) 8.6-50 mg tablet Take 1 tablet by mouth 2 (two) times a day.     Past Medical History:    Past Medical History:   Diagnosis Date     Cataract     bilat     Parkinson disease            PHYSICAL EXAMINATION  Vitals:    05/13/18 2127   BP: 133/60   Pulse: 76   Resp: 18   Temp: 97.3  F (36.3  C)   SpO2: 93%       Today on physical exam:     GENERAL: Awake, Alert, oriented x3, not in any form of acute distress, answers questions appropriately, follows simple commands, conversant  HEENT: Head is normocephalic with normal hair distribution. No evidence of trauma. Ears: No acute purulent discharge. Eyes: Conjunctivae pink with no scleral jaundice. Nose: Normal mucosa and septum. NECK: Supple with no cervical or supraclavicular lymphadenopathy. Trachea is midline.   CHEST: No tenderness or deformity, no crepitus  LUNG: Dim to auscultation with good  chest expansion. There are no crackles or wheezes, normal AP diameter.  BACK: No kyphosis of the thoracic spine. Symmetric, no curvature, ROM normal, no CVA tenderness, no spinal tenderness   CVS: There is good S1  S2, regular rhythm, there are no murmurs, rubs, gallops, or heaves,  2+ pulses symmetric in all extremities.  ABDOMEN: Rounded and soft, nontender to palpation, non distended, no masses, no organomegaly, good bowel sounds, no rebound or guarding, no peritoneal signs.   EXTREMITIES: 1+ lle pedal edema, Left hip incision c/d/i  SKIN: Warm and dry, no erythema noted.  Skin color, texture, no rashes or lesions.  NEUROLOGICAL: The patient is oriented to person, place and time, forgetful. Weakness, fatigue, baseline tremor, shuffled gait            LABS:   Recent Results (from the past 168 hour(s))   Basic Metabolic Panel   Result Value Ref Range    Sodium 145 136 - 145 mmol/L    Potassium 3.8 3.5 - 5.0 mmol/L    Chloride 111 (H) 98 - 107 mmol/L    CO2 29 22 - 31 mmol/L    Anion Gap, Calculation 5 5 - 18 mmol/L    Glucose 98 70 - 125 mg/dL    Calcium 8.9 8.5 - 10.5 mg/dL    BUN 25 8 - 28 mg/dL    Creatinine 0.67 0.60 - 1.10 mg/dL    GFR MDRD Af Amer >60 >60 mL/min/1.73m2    GFR MDRD Non Af Amer >60 >60 mL/min/1.73m2   HM1 (CBC with Diff)   Result Value Ref Range    WBC 7.8 4.0 - 11.0 thou/uL    RBC 3.25 (L) 3.80 - 5.40 mill/uL    Hemoglobin 9.7 (L) 12.0 - 16.0 g/dL    Hematocrit 30.0 (L) 35.0 - 47.0 %    MCV 92 80 - 100 fL    MCH 29.8 27.0 - 34.0 pg    MCHC 32.3 32.0 - 36.0 g/dL    RDW 14.7 (H) 11.0 - 14.5 %    Platelets 348 140 - 440 thou/uL    MPV 9.7 8.5 - 12.5 fL    Neutrophils % 72 (H) 50 - 70 %    Lymphocytes % 18 (L) 20 - 40 %    Monocytes % 7 2 - 10 %    Eosinophils % 2 0 - 6 %    Basophils % 1 0 - 2 %    Neutrophils Absolute 5.6 2.0 - 7.7 thou/uL    Lymphocytes Absolute 1.4 0.8 - 4.4 thou/uL    Monocytes Absolute 0.6 0.0 - 0.9 thou/uL    Eosinophils Absolute 0.2 0.0 - 0.4 thou/uL    Basophils Absolute  0.0 0.0 - 0.2 thou/uL     Results for orders placed or performed in visit on 05/09/18   Basic Metabolic Panel   Result Value Ref Range    Sodium 145 136 - 145 mmol/L    Potassium 3.8 3.5 - 5.0 mmol/L    Chloride 111 (H) 98 - 107 mmol/L    CO2 29 22 - 31 mmol/L    Anion Gap, Calculation 5 5 - 18 mmol/L    Glucose 98 70 - 125 mg/dL    Calcium 8.9 8.5 - 10.5 mg/dL    BUN 25 8 - 28 mg/dL    Creatinine 0.67 0.60 - 1.10 mg/dL    GFR MDRD Af Amer >60 >60 mL/min/1.73m2    GFR MDRD Non Af Amer >60 >60 mL/min/1.73m2         Lab Results   Component Value Date    WBC 7.8 05/09/2018    HGB 9.7 (L) 05/09/2018    HCT 30.0 (L) 05/09/2018    MCV 92 05/09/2018     05/09/2018       No results found for: [id number]  No results found for: HGBA1C  No results found for: INR, PROTIME  Vitamin D, Total (25-Hydroxy)   Date Value Ref Range Status   05/01/2017 36.0 30.0 - 80.0 ng/mL Final     Lab Results   Component Value Date    TSH 2.02 11/22/2017           ASSESSMENT/PLAN:    1. Left hip fracture, s/p bipolar hemiarthroplasty: Incision c/d/i. Pain controlled on tylenol TID. Also tylenol prn. ASA bid. F/u with ortho 5/17. Stopped tramadol and pain still controlled.   2. Aspiration pneumonia, dysphagia: NDD4, honey thickened liquids-now on thin liquids. ST following. IS q1h while awake. Completed augmentin 5/11.   3. Parkinson's disease: Baseline tremor. On sinemet.   4. Depression, insomnia: On remeron.   5. Acute blood loss anemia: Hg 9.4 on 5/5. HM1 ordered 5/9-9.7. Repeat HM1 on 5/21.   6. Dementia: On aricept. Forgetful but a/o.   7. Constipation: on senna/docusate bid.     Per therapy ambulating 120 ft with walker, SBA, transfers SBA, bed mobility min, upper body set up, lower body mod, toileting min to mod, ST advanced diet to thin. Recommending LCD 5/18 for ST, 2 weeks for PT, OT      Electronically signed by: Toña Diallo NP    Total 25 minutes of which 75% was spent in counseling and coordination of care of the above  plan    Time spent in interview and examination of patient, review of available records, and discussion with nursing staff. Continue care plan, efforts at therapy, and monitor nutritional status.

## 2021-06-18 NOTE — PROGRESS NOTES
[facility]    DATE: 2018    NAME:  Christen Loya             :  10/6/1928  MRN: [id number]  CODE STATUS:  FULL CODE    VISIT TYPE: Review Of Multiple Medical Conditions     FACILITY:  [facility] [[id number]]       CHIEF COMPLAIN/REASON FOR VISIT:    Chief Complaint   Patient presents with     Review Of Multiple Medical Conditions               HISTORY OF PRESENT ILLNESS: Christen Loya is a 89 y.o. female who was admitted - for fall, Left hip fracture. Postop she had lethargy with pain meds as well as aspiration pneumonia and treated with levaquin then later zosyn and augmentin. ST evaluated and was changed to thickened liquids. She also had urinary retention and otto was removed but required straight cath as needed. She was discharged on bladder scan twice daily. Her oxycodone was discontinued and started on tramadol and tylenol. She developed loose stools and c diff was sent. She has PMH of Parkinson's disease, depression, anxiety, insomnia. Prior to this she lived at home in a house with her  and son.     Today Ms. Loya states she feels very good today. She says she walked with therapy and had no pain today and even ate all her breakfast, which she never does. She had oatmeal and says it tasted very good today. She says thinks are looking up today and she finally feels she is making some gains. She does not think she needs the scheduled tylenol anymore and says her bowels are moving great. She has no issues with breathing or with bladder. She does want to know if her madeline stockings can be stopped as she has no swelling. She has nothing negative to report today. She seems in very good and positive spirits today. Per staff no complaints of pain or concerns recently. Her appetite has been better and vitals are stable.     REVIEW OF SYSTEMS:  PROBLEMS AND REVIEW OF SYSTEMS:   Review of Systems  Today on ROS:   Currently, no fever, chills,  or rigors. Decreased vision and hearing. Denies any chest pain, headaches, palpitations, lightheadedness, dizziness, shortness of breath, or cough. Appetite is good. Denies any GERD symptoms. Denies any difficulty with swallowing, nausea, or vomiting.  Denies any abdominal pain, diarrhea or constipation. Denies any urinary symptoms. No insomnia. No active bleeding. No rash. Positive for forgetfulness, baseline tremor, dysphagia-improved, hip incision, more positive and upbeat today, no pain recently      Allergies   Allergen Reactions     Demerol [Meperidine]      Sulfa (Sulfonamide Antibiotics)      Current Outpatient Prescriptions   Medication Sig     acetaminophen (TYLENOL) 500 MG tablet Take 2 tablets (1,000 mg total) by mouth 3 (three) times a day.     acetaminophen (TYLENOL) 500 MG tablet Take 500 mg by mouth every 12 (twelve) hours as needed for pain.     aspirin 325 MG tablet Take 1 tablet (325 mg total) by mouth 2 (two) times a day for 23 days.     carbidopa-levodopa (SINEMET CR)  mg ER tablet Take 1 tablet by mouth bedtime.     carbidopa-levodopa (SINEMET)  mg per tablet Take 1.5 tablets by mouth 3 (three) times a day. Take at 8am, 12pm, and 4pm     donepezil (ARICEPT) 10 MG tablet Take 10 mg by mouth at bedtime. Indications: Mild to Moderate Alzheimer's Type Dementia     mirtazapine (REMERON) 15 MG tablet Take 15 mg by mouth at bedtime. Indications: major depressive disorder     senna-docusate (PERICOLACE) 8.6-50 mg tablet Take 1 tablet by mouth 2 (two) times a day.     Past Medical History:    Past Medical History:   Diagnosis Date     Cataract     bilat     Parkinson disease            PHYSICAL EXAMINATION  Vitals:    05/20/18 2143   BP: 118/57   Pulse: 76   Resp: 20   Temp: 97.7  F (36.5  C)   SpO2: 97%       Today on physical exam:     GENERAL: Awake, Alert, oriented x3, not in any form of acute distress, answers questions appropriately, follows simple commands, conversant  HEENT: Head is  normocephalic with normal hair distribution. No evidence of trauma. Ears: No acute purulent discharge. Eyes: Conjunctivae pink with no scleral jaundice. Nose: Normal mucosa and septum. NECK: Supple with no cervical or supraclavicular lymphadenopathy. Trachea is midline.   CHEST: No tenderness or deformity, no crepitus  LUNG: Dim to auscultation with good chest expansion. There are no crackles or wheezes, normal AP diameter.  BACK: No kyphosis of the thoracic spine. Symmetric, no curvature, ROM normal, no CVA tenderness, no spinal tenderness   CVS: There is good S1  S2, regular rhythm, there are no murmurs, rubs, gallops, or heaves,  2+ pulses symmetric in all extremities.  ABDOMEN: Rounded and soft, nontender to palpation, non distended, no masses, no organomegaly, good bowel sounds, no rebound or guarding, no peritoneal signs.   EXTREMITIES: no pedal edema, Left hip incision healing  SKIN: Warm and dry, no erythema noted.  Skin color, texture, no rashes or lesions.  NEUROLOGICAL: The patient is oriented to person, place and time, forgetful. Weakness, fatigue, baseline tremor, shuffled gait            LABS:   No results found for this or any previous visit (from the past 168 hour(s)).  Results for orders placed or performed in visit on 05/09/18   Basic Metabolic Panel   Result Value Ref Range    Sodium 145 136 - 145 mmol/L    Potassium 3.8 3.5 - 5.0 mmol/L    Chloride 111 (H) 98 - 107 mmol/L    CO2 29 22 - 31 mmol/L    Anion Gap, Calculation 5 5 - 18 mmol/L    Glucose 98 70 - 125 mg/dL    Calcium 8.9 8.5 - 10.5 mg/dL    BUN 25 8 - 28 mg/dL    Creatinine 0.67 0.60 - 1.10 mg/dL    GFR MDRD Af Amer >60 >60 mL/min/1.73m2    GFR MDRD Non Af Amer >60 >60 mL/min/1.73m2         Lab Results   Component Value Date    WBC 7.8 05/09/2018    HGB 9.7 (L) 05/09/2018    HCT 30.0 (L) 05/09/2018    MCV 92 05/09/2018     05/09/2018       No results found for: JVOIXUMY01  No results found for: HGBA1C  No results found for: INR,  PROTIME  Vitamin D, Total (25-Hydroxy)   Date Value Ref Range Status   05/01/2017 36.0 30.0 - 80.0 ng/mL Final     Lab Results   Component Value Date    TSH 2.02 11/22/2017           ASSESSMENT/PLAN:    1. Left hip fracture, s/p bipolar hemiarthroplasty: Incision healing. ASA bid. F/u with ortho 5/17-doing well, xrays stable, f/u 4 weeks. Changed tylenol to prn. No pain recently. DC teds. No edema.   2. Aspiration pneumonia, dysphagia: NDD4, honey thickened liquids-now on thin liquids. ST signed off on 5/18. IS q1h while awake. Completed augmentin 5/11. Doing well on thin liquids, encouraging chin tuck.   3. Parkinson's disease: Baseline tremor. On sinemet.   4. Depression, insomnia: On remeron.   5. Acute blood loss anemia: Hg 9.4 on 5/5. HM1 ordered 5/9-9.7. Repeat HM1 on 5/21-Hg now 11. Resolved.   6. Dementia: On aricept. Forgetful but a/o.   7. Constipation: on senna/docusate bid.     Per therapy ambulating 150 ft with walker, CGA, transfers SBA, bed mobility min, upper body set up, lower body min, toileting min to mod. Recommending LCD 5/18 for ST, 2 weeks for PT, OT    Electronically signed by: Toña Diallo NP    Total 25 minutes of which 75% was spent in counseling and coordination of care of the above plan    Time spent in interview and examination of patient, review of available records, and discussion with nursing staff. Continue care plan, efforts at therapy, and monitor nutritional status.

## 2021-06-18 NOTE — PROGRESS NOTES
[facility]    Facility:   [facility] [[id number]]   Code Status: DNR/DNI      CHIEF COMPLAINT/REASON FOR VISIT:  Chief Complaint   Patient presents with     Review Of Multiple Medical Conditions       HISTORY:      HPI: Christen is a 89 y.o. female who fell and fractured her left hip requiring ORIF.  During the hospital stay she developed aspiration pneumonia in the right lower lobe.  Her underlying medical history is Parkinson's disease for which she takes Sinemet, dementia for which she is taking Aricept, and depression/anxiety/insomnia for which she is taking Remeron.    Upon review of systems currently, she is feeling fine in general.  Specifically she is not having fevers or chills, no cough or shortness of breath or chest pain.  She also does not have sore throat or abdominal pain or nausea and no dysuria.  Upon review with the nursing staff and therapy team, she is making slow but steady progress and her diet has been advanced to thin liquids.    Past Medical History:   Diagnosis Date     Cataract     bilat     Parkinson disease              Family History   Problem Relation Age of Onset     Breast cancer Sister 61     Cancer Son 53     Social History     Social History     Marital status:      Spouse name: N/A     Number of children: N/A     Years of education: N/A     Social History Main Topics     Smoking status: Former Smoker     Smokeless tobacco: Never Used     Alcohol use No     Drug use: No     Sexual activity: Not on file     Other Topics Concern     Not on file     Social History Narrative    Lives with her          Review of Systems   Constitutional: Positive for fatigue.       .  Vitals:    05/21/18 1436   BP: 129/58   Pulse: 68   Resp: 18   Temp: 98.4  F (36.9  C)   SpO2: 97%       Physical Exam   Constitutional: No distress.   She is taking a nap in her bed when I arrived   Eyes: Right eye exhibits no discharge. Left eye exhibits no  discharge.   Cardiovascular: Normal heart sounds.    Pulmonary/Chest: Breath sounds normal. No respiratory distress.   Abdominal: She exhibits no distension. There is no tenderness.   Musculoskeletal: She exhibits no edema.   Neurological: She is alert.   Psychiatric: She has a normal mood and affect.   Nursing note and vitals reviewed.        LABS:   No new laboratory testing    ASSESSMENT:      ICD-10-CM    1. Closed displaced fracture of left femoral neck S72.002A    2. S/P hip hemiarthroplasty Z96.649    3. Aspiration pneumonia of right lower lobe, unspecified aspiration pneumonia type J69.0    4. Bullous pemphigoid L12.0    5. Parkinson disease G20    6. Dementia without behavioral disturbance, unspecified dementia type F03.90        PLAN:    Continue current plan of therapies.  Continue current medications.      Electronically signed by: Juan Bautista MD

## 2021-06-18 NOTE — PROGRESS NOTES
[facility]    DATE: 2018    NAME:  Christen Loya             :  10/6/1928  MRN: [id number]  CODE STATUS:  FULL CODE    VISIT TYPE: Review Of Multiple Medical Conditions     FACILITY:  [facility] [[id number]]       CHIEF COMPLAIN/REASON FOR VISIT:    Chief Complaint   Patient presents with     Review Of Multiple Medical Conditions               HISTORY OF PRESENT ILLNESS: Christen Loya is a 89 y.o. female who was admitted - for fall, Left hip fracture. Postop she had lethargy with pain meds as well as aspiration pneumonia and treated with levaquin then later zosyn and augmentin. ST evaluated and was changed to thickened liquids. She also had urinary retention and otto was removed but required straight cath as needed. She was discharged on bladder scan twice daily. Her oxycodone was discontinued and started on tramadol and tylenol. She developed loose stools and c diff was sent. She has PMH of Parkinson's disease, depression, anxiety, insomnia. Prior to this she lived at home in a house with her  and son.     Today Ms. Loya states she went to ortho this morning and had a good report. She says they told her she is healing well and to follow up again later. She reports no concerns with bowels or bladder. She sleeps well with no appetite concerns. She says her breathing is good. She says she has no pain and even after going to the doctor and coming back she has had no pain. She says she is just tired after the busy mornings. Discussed reducing scheduled tylenol from three times daily to two times daily and she is not sure if she even needs that. Will reduce to twice daily and then next week consider changing to as needed. Per staff ortho said xrays were stable and healing well. Removed sutures and said to f/u in 4 weeks. No concerns per staff.     REVIEW OF SYSTEMS:  PROBLEMS AND REVIEW OF SYSTEMS:   Review of Systems  Today on ROS:    Currently, no fever, chills, or rigors. Decreased vision and hearing. Denies any chest pain, headaches, palpitations, lightheadedness, dizziness, shortness of breath, or cough. Appetite is good. Denies any GERD symptoms. Denies any difficulty with swallowing, nausea, or vomiting.  Denies any abdominal pain, diarrhea or constipation. Denies any urinary symptoms. No insomnia. No active bleeding. No rash. Positive for forgetfulness, baseline tremor, dysphagia, hip incision      Allergies   Allergen Reactions     Demerol [Meperidine]      Sulfa (Sulfonamide Antibiotics)      Current Outpatient Prescriptions   Medication Sig     acetaminophen (TYLENOL) 500 MG tablet Take 2 tablets (1,000 mg total) by mouth 3 (three) times a day.     acetaminophen (TYLENOL) 500 MG tablet Take 500 mg by mouth every 12 (twelve) hours as needed for pain.     aspirin 325 MG tablet Take 1 tablet (325 mg total) by mouth 2 (two) times a day for 23 days.     carbidopa-levodopa (SINEMET CR)  mg ER tablet Take 1 tablet by mouth bedtime.     carbidopa-levodopa (SINEMET)  mg per tablet Take 1.5 tablets by mouth 3 (three) times a day. Take at 8am, 12pm, and 4pm     donepezil (ARICEPT) 10 MG tablet Take 10 mg by mouth at bedtime. Indications: Mild to Moderate Alzheimer's Type Dementia     mirtazapine (REMERON) 15 MG tablet Take 15 mg by mouth at bedtime. Indications: major depressive disorder     senna-docusate (PERICOLACE) 8.6-50 mg tablet Take 1 tablet by mouth 2 (two) times a day.     Past Medical History:    Past Medical History:   Diagnosis Date     Cataract     bilat     Parkinson disease            PHYSICAL EXAMINATION  Vitals:    05/16/18 2132   BP: 146/63   Pulse: 73   Resp: 17   Temp: 97  F (36.1  C)   SpO2: 97%       Today on physical exam:     GENERAL: Awake, Alert, oriented x3, not in any form of acute distress, answers questions appropriately, follows simple commands, conversant  HEENT: Head is normocephalic with normal hair  distribution. No evidence of trauma. Ears: No acute purulent discharge. Eyes: Conjunctivae pink with no scleral jaundice. Nose: Normal mucosa and septum. NECK: Supple with no cervical or supraclavicular lymphadenopathy. Trachea is midline.   CHEST: No tenderness or deformity, no crepitus  LUNG: Dim to auscultation with good chest expansion. There are no crackles or wheezes, normal AP diameter.  BACK: No kyphosis of the thoracic spine. Symmetric, no curvature, ROM normal, no CVA tenderness, no spinal tenderness   CVS: There is good S1  S2, regular rhythm, there are no murmurs, rubs, gallops, or heaves,  2+ pulses symmetric in all extremities.  ABDOMEN: Rounded and soft, nontender to palpation, non distended, no masses, no organomegaly, good bowel sounds, no rebound or guarding, no peritoneal signs.   EXTREMITIES: 1+ lle pedal edema, Left hip incision c/d/i  SKIN: Warm and dry, no erythema noted.  Skin color, texture, no rashes or lesions.  NEUROLOGICAL: The patient is oriented to person, place and time, forgetful. Weakness, fatigue, baseline tremor, shuffled gait            LABS:   No results found for this or any previous visit (from the past 168 hour(s)).  Results for orders placed or performed in visit on 05/09/18   Basic Metabolic Panel   Result Value Ref Range    Sodium 145 136 - 145 mmol/L    Potassium 3.8 3.5 - 5.0 mmol/L    Chloride 111 (H) 98 - 107 mmol/L    CO2 29 22 - 31 mmol/L    Anion Gap, Calculation 5 5 - 18 mmol/L    Glucose 98 70 - 125 mg/dL    Calcium 8.9 8.5 - 10.5 mg/dL    BUN 25 8 - 28 mg/dL    Creatinine 0.67 0.60 - 1.10 mg/dL    GFR MDRD Af Amer >60 >60 mL/min/1.73m2    GFR MDRD Non Af Amer >60 >60 mL/min/1.73m2         Lab Results   Component Value Date    WBC 7.8 05/09/2018    HGB 9.7 (L) 05/09/2018    HCT 30.0 (L) 05/09/2018    MCV 92 05/09/2018     05/09/2018       No results found for: [id number]  No results found for: HGBA1C  No results found for: INR, PROTIME  Vitamin D, Total  (25-Hydroxy)   Date Value Ref Range Status   05/01/2017 36.0 30.0 - 80.0 ng/mL Final     Lab Results   Component Value Date    TSH 2.02 11/22/2017           ASSESSMENT/PLAN:    1. Left hip fracture, s/p bipolar hemiarthroplasty: Incision c/d/i. Pain controlled on tylenol TID. Also tylenol prn. ASA bid. F/u with ortho 5/17-doing well, xrays stable, f/u 4 weeks. Changed tylenol to bid scheduled and 500mg q6h prn. Will change to prn at next visit if still no pain.   2. Aspiration pneumonia, dysphagia: NDD4, honey thickened liquids-now on thin liquids. ST following. IS q1h while awake. Completed augmentin 5/11.   3. Parkinson's disease: Baseline tremor. On sinemet.   4. Depression, insomnia: On remeron.   5. Acute blood loss anemia: Hg 9.4 on 5/5. HM1 ordered 5/9-9.7. Repeat HM1 on 5/21.   6. Dementia: On aricept. Forgetful but a/o.   7. Constipation: on senna/docusate bid.     Per therapy ambulating 120 ft with walker, SBA, transfers SBA, bed mobility min, upper body set up, lower body mod, toileting min to mod, ST advanced diet to thin. Recommending LCD 5/18 for ST, 2 weeks for PT, OT      Electronically signed by: Toña Diallo NP    Total 25 minutes of which 75% was spent in counseling and coordination of care of the above plan    Time spent in interview and examination of patient, review of available records, and discussion with nursing staff. Continue care plan, efforts at therapy, and monitor nutritional status.

## 2021-06-18 NOTE — PROGRESS NOTES
[facility]    DATE: 2018    NAME:  Christen Loya             :  10/6/1928  MRN: [id number]  CODE STATUS:  FULL CODE    VISIT TYPE: Review Of Multiple Medical Conditions     FACILITY:  [facility] [[id number]]       CHIEF COMPLAIN/REASON FOR VISIT:    Chief Complaint   Patient presents with     Review Of Multiple Medical Conditions               HISTORY OF PRESENT ILLNESS: Christen Loya is a 89 y.o. female who was admitted - for fall, Left hip fracture. Postop she had lethargy with pain meds as well as aspiration pneumonia and treated with levaquin then later zosyn and augmentin. ST evaluated and was changed to thickened liquids. She also had urinary retention and otto was removed but required straight cath as needed. She was discharged on bladder scan twice daily. Her oxycodone was discontinued and started on tramadol and tylenol. She developed loose stools and c diff was sent. She has PMH of Parkinson's disease, depression, anxiety, insomnia. Prior to this she lived at home in a house with her  and son.     Today Ms. Loya states she slept well and had a good breakfast today. She just finished therapy and is hoping to lay down for a time. She says she gets to go home later this week and she is very happy about that. She says her bowels are working and appetite is good. She denies any pain today. She says everything is going well and cannot wait to go home. She hopes the weather is nice this week. Per staff vitals are stable and no recent complaints of pain or other concerns. She is set to discharge on .     REVIEW OF SYSTEMS:  PROBLEMS AND REVIEW OF SYSTEMS:   Review of Systems  Today on ROS: she wants to know if she is still on the medicine that makes her goofy.   Currently, no fever, chills, or rigors. Decreased vision and hearing. Denies any chest pain, headaches, palpitations, lightheadedness, dizziness, shortness of breath, or  cough. Appetite is good. Denies any GERD symptoms. Denies any difficulty with swallowing, nausea, or vomiting.  Denies any abdominal pain, diarrhea or constipation. Denies any urinary symptoms. No insomnia. No active bleeding. No rash. Positive for forgetfulness, baseline tremor, dysphagia-improved, hip incision-healing, no pain recently      Allergies   Allergen Reactions     Demerol [Meperidine]      Sulfa (Sulfonamide Antibiotics)      Current Outpatient Prescriptions   Medication Sig     acetaminophen (TYLENOL) 500 MG tablet Take 2 tablets (1,000 mg total) by mouth 3 (three) times a day.     acetaminophen (TYLENOL) 500 MG tablet Take 500 mg by mouth every 12 (twelve) hours as needed for pain.     carbidopa-levodopa (SINEMET CR)  mg ER tablet Take 1 tablet by mouth bedtime.     carbidopa-levodopa (SINEMET)  mg per tablet Take 1.5 tablets by mouth 3 (three) times a day. Take at 8am, 12pm, and 4pm     donepezil (ARICEPT) 10 MG tablet Take 10 mg by mouth at bedtime. Indications: Mild to Moderate Alzheimer's Type Dementia     mirtazapine (REMERON) 15 MG tablet Take 15 mg by mouth at bedtime. Indications: major depressive disorder     senna-docusate (PERICOLACE) 8.6-50 mg tablet Take 1 tablet by mouth 2 (two) times a day.     Past Medical History:    Past Medical History:   Diagnosis Date     Cataract     bilat     Parkinson disease            PHYSICAL EXAMINATION  Vitals:    06/03/18 2153   BP: 106/57   Pulse: 81   Resp: 19   Temp: 97.6  F (36.4  C)   SpO2: 96%       Today on physical exam:     GENERAL: Awake, Alert, oriented x3, not in any form of acute distress, answers questions appropriately, follows simple commands, conversant  HEENT: Head is normocephalic with normal hair distribution. No evidence of trauma. Ears: No acute purulent discharge. Eyes: Conjunctivae pink with no scleral jaundice. Nose: Normal mucosa and septum. NECK: Supple with no cervical or supraclavicular lymphadenopathy. Trachea is  midline.   CHEST: No tenderness or deformity, no crepitus  LUNG: Dim to auscultation with good chest expansion. There are no crackles or wheezes, normal AP diameter.  BACK: No kyphosis of the thoracic spine. Symmetric, no curvature, ROM normal, no CVA tenderness, no spinal tenderness   CVS: There is good S1  S2, regular rhythm, there are no murmurs, rubs, gallops, or heaves,  2+ pulses symmetric in all extremities.  ABDOMEN: Rounded and soft, nontender to palpation, non distended, no masses, no organomegaly, good bowel sounds, no rebound or guarding, no peritoneal signs.   EXTREMITIES: no pedal edema, Left hip incision healed  SKIN: Warm and dry, no erythema noted.  Skin color, texture, no rashes or lesions.  NEUROLOGICAL: The patient is oriented to person, place and time, forgetful. baseline tremor, shuffled gait            LABS:   No results found for this or any previous visit (from the past 168 hour(s)).  Results for orders placed or performed in visit on 05/09/18   Basic Metabolic Panel   Result Value Ref Range    Sodium 145 136 - 145 mmol/L    Potassium 3.8 3.5 - 5.0 mmol/L    Chloride 111 (H) 98 - 107 mmol/L    CO2 29 22 - 31 mmol/L    Anion Gap, Calculation 5 5 - 18 mmol/L    Glucose 98 70 - 125 mg/dL    Calcium 8.9 8.5 - 10.5 mg/dL    BUN 25 8 - 28 mg/dL    Creatinine 0.67 0.60 - 1.10 mg/dL    GFR MDRD Af Amer >60 >60 mL/min/1.73m2    GFR MDRD Non Af Amer >60 >60 mL/min/1.73m2         Lab Results   Component Value Date    WBC 6.2 05/21/2018    HGB 11.3 (L) 05/21/2018    HCT 37.3 05/21/2018    MCV 96 05/21/2018     05/21/2018       No results found for: [id number]  No results found for: HGBA1C  No results found for: INR, PROTIME  Vitamin D, Total (25-Hydroxy)   Date Value Ref Range Status   05/01/2017 36.0 30.0 - 80.0 ng/mL Final     Lab Results   Component Value Date    TSH 2.02 11/22/2017           ASSESSMENT/PLAN:    1. Left hip fracture, s/p bipolar hemiarthroplasty: Incision healing. ASA bid. F/u  with ortho 5/17-doing well, xrays stable, f/u 4 weeks on 6/14.Tylenol prn, no pain recently.   2. Aspiration pneumonia, dysphagia: NDD4, honey thickened liquids-now on thin liquids. Completed augmentin 5/11. Doing well on thin liquids, will go home on thin. Completed ST on 5/18.   3. Parkinson's disease: Baseline tremor. On sinemet. Stable.   4. Depression, insomnia: On remeron. Stable.   5. Acute blood loss anemia: Resolved.    6. Dementia: On aricept. Forgetful but a/o.   7. Constipation: on senna/docusate bid.     Per therapy ambulating 150 ft with walker, CGA, transfers SBA, bed mobility min, upper body set up, lower body min, toileting min to mod. Recommending LCD 5/18 for ST, 2 weeks for PT, OT. LCD 6/6    Electronically signed by: Toña Diallo NP    Total 25 minutes of which 75% was spent in counseling and coordination of care of the above plan    Time spent in interview and examination of patient, review of available records, and discussion with nursing staff. Continue care plan, efforts at therapy, and monitor nutritional status.

## 2021-07-13 ENCOUNTER — RECORDS - HEALTHEAST (OUTPATIENT)
Dept: ADMINISTRATIVE | Facility: CLINIC | Age: 86
End: 2021-07-13

## 2021-07-21 ENCOUNTER — RECORDS - HEALTHEAST (OUTPATIENT)
Dept: ADMINISTRATIVE | Facility: CLINIC | Age: 86
End: 2021-07-21

## 2021-07-22 ENCOUNTER — RECORDS - HEALTHEAST (OUTPATIENT)
Dept: SCHEDULING | Facility: CLINIC | Age: 86
End: 2021-07-22

## 2021-07-22 DIAGNOSIS — Z12.31 OTHER SCREENING MAMMOGRAM: ICD-10-CM

## 2021-08-03 PROBLEM — J69.0 ASPIRATION PNEUMONIA (H): Status: RESOLVED | Noted: 2018-05-07 | Resolved: 2018-05-21

## 2024-06-06 NOTE — PROGRESS NOTES
[facility]    DATE: 2018    NAME:  Christen Loya             :  10/6/1928  MRN: [id number]  CODE STATUS:  FULL CODE    VISIT TYPE: Review Of Multiple Medical Conditions     FACILITY:  [facility] [[id number]]       CHIEF COMPLAIN/REASON FOR VISIT:    Chief Complaint   Patient presents with     Review Of Multiple Medical Conditions               HISTORY OF PRESENT ILLNESS: Christen Loya is a 89 y.o. female who was admitted - for fall, Left hip fracture. Postop she had lethargy with pain meds as well as aspiration pneumonia and treated with levaquin then later zosyn and augmentin. ST evaluated and was changed to thickened liquids. She also had urinary retention and otto was removed but required straight cath as needed. She was discharged on bladder scan twice daily. Her oxycodone was discontinued and started on tramadol and tylenol. She developed loose stools and c diff was sent. She has PMH of Parkinson's disease, depression, anxiety, insomnia. Prior to this she lived at home in a house with her  and son.     Today Ms. Loya states she wants to know if she is still on the medication that made her loopy. She can't remember the name but she was taking it for pain. She wants to make sure she is not getting that anymore. She says therapy is going well and she is not having much pain anymore. Her appetite is improving and she is going to return home next week. She is looking forward to this. She denies any chest pain, breathing problems, issues with bowels or other concerns. Her next follow up with ortho is  and she is discharging on .      REVIEW OF SYSTEMS:  PROBLEMS AND REVIEW OF SYSTEMS:   Review of Systems  Today on ROS: she wants to know if she is still on the medicine that makes her goofy.   Currently, no fever, chills, or rigors. Decreased vision and hearing. Denies any chest pain, headaches, palpitations, lightheadedness,  dizziness, shortness of breath, or cough. Appetite is good. Denies any GERD symptoms. Denies any difficulty with swallowing, nausea, or vomiting.  Denies any abdominal pain, diarrhea or constipation. Denies any urinary symptoms. No insomnia. No active bleeding. No rash. Positive for forgetfulness, baseline tremor, dysphagia-improved, hip incision-healing, no pain recently      Allergies   Allergen Reactions     Demerol [Meperidine]      Sulfa (Sulfonamide Antibiotics)      Current Outpatient Prescriptions   Medication Sig     acetaminophen (TYLENOL) 500 MG tablet Take 2 tablets (1,000 mg total) by mouth 3 (three) times a day.     acetaminophen (TYLENOL) 500 MG tablet Take 500 mg by mouth every 12 (twelve) hours as needed for pain.     carbidopa-levodopa (SINEMET CR)  mg ER tablet Take 1 tablet by mouth bedtime.     carbidopa-levodopa (SINEMET)  mg per tablet Take 1.5 tablets by mouth 3 (three) times a day. Take at 8am, 12pm, and 4pm     donepezil (ARICEPT) 10 MG tablet Take 10 mg by mouth at bedtime. Indications: Mild to Moderate Alzheimer's Type Dementia     mirtazapine (REMERON) 15 MG tablet Take 15 mg by mouth at bedtime. Indications: major depressive disorder     senna-docusate (PERICOLACE) 8.6-50 mg tablet Take 1 tablet by mouth 2 (two) times a day.     Past Medical History:    Past Medical History:   Diagnosis Date     Cataract     bilat     Parkinson disease            PHYSICAL EXAMINATION  Vitals:    05/31/18 2118   BP: 117/56   Pulse: 89   Resp: 18   Temp: 97.8  F (36.6  C)   SpO2: 97%       Today on physical exam:     GENERAL: Awake, Alert, oriented x3, not in any form of acute distress, answers questions appropriately, follows simple commands, conversant  HEENT: Head is normocephalic with normal hair distribution. No evidence of trauma. Ears: No acute purulent discharge. Eyes: Conjunctivae pink with no scleral jaundice. Nose: Normal mucosa and septum. NECK: Supple with no cervical or  supraclavicular lymphadenopathy. Trachea is midline.   CHEST: No tenderness or deformity, no crepitus  LUNG: Dim to auscultation with good chest expansion. There are no crackles or wheezes, normal AP diameter.  BACK: No kyphosis of the thoracic spine. Symmetric, no curvature, ROM normal, no CVA tenderness, no spinal tenderness   CVS: There is good S1  S2, regular rhythm, there are no murmurs, rubs, gallops, or heaves,  2+ pulses symmetric in all extremities.  ABDOMEN: Rounded and soft, nontender to palpation, non distended, no masses, no organomegaly, good bowel sounds, no rebound or guarding, no peritoneal signs.   EXTREMITIES: no pedal edema, Left hip incision healed  SKIN: Warm and dry, no erythema noted.  Skin color, texture, no rashes or lesions.  NEUROLOGICAL: The patient is oriented to person, place and time, forgetful. baseline tremor, shuffled gait            LABS:   No results found for this or any previous visit (from the past 168 hour(s)).  Results for orders placed or performed in visit on 05/09/18   Basic Metabolic Panel   Result Value Ref Range    Sodium 145 136 - 145 mmol/L    Potassium 3.8 3.5 - 5.0 mmol/L    Chloride 111 (H) 98 - 107 mmol/L    CO2 29 22 - 31 mmol/L    Anion Gap, Calculation 5 5 - 18 mmol/L    Glucose 98 70 - 125 mg/dL    Calcium 8.9 8.5 - 10.5 mg/dL    BUN 25 8 - 28 mg/dL    Creatinine 0.67 0.60 - 1.10 mg/dL    GFR MDRD Af Amer >60 >60 mL/min/1.73m2    GFR MDRD Non Af Amer >60 >60 mL/min/1.73m2         Lab Results   Component Value Date    WBC 6.2 05/21/2018    HGB 11.3 (L) 05/21/2018    HCT 37.3 05/21/2018    MCV 96 05/21/2018     05/21/2018       No results found for: [id number]  No results found for: HGBA1C  No results found for: INR, PROTIME  Vitamin D, Total (25-Hydroxy)   Date Value Ref Range Status   05/01/2017 36.0 30.0 - 80.0 ng/mL Final     Lab Results   Component Value Date    TSH 2.02 11/22/2017           ASSESSMENT/PLAN:    1. Left hip fracture, s/p bipolar  hemiarthroplasty: Incision healing. ASA bid. F/u with ortho 5/17-doing well, xrays stable, f/u 4 weeks on 6/14.Tylenol prn, no pain recently.   2. Aspiration pneumonia, dysphagia: NDD4, honey thickened liquids-now on thin liquids. ST signed off on 5/18. Completed augmentin 5/11. Doing well on thin liquids, no recent concerns.    3. Parkinson's disease: Baseline tremor. On sinemet. Stable.   4. Depression, insomnia: On remeron. Stable.   5. Acute blood loss anemia: Hg 9.4 on 5/5. HM1 ordered 5/9-9.7. Repeat HM1 on 5/21-Hg now 11. Resolved.   6. Dementia: On aricept. Forgetful but a/o.   7. Constipation: on senna/docusate bid.     Per therapy ambulating 150 ft with walker, CGA, transfers SBA, bed mobility min, upper body set up, lower body min, toileting min to mod. Recommending LCD 5/18 for ST, 2 weeks for PT, OT. LCD 6/6    Electronically signed by: Toña Diallo NP    Total 25 minutes of which 75% was spent in counseling and coordination of care of the above plan    Time spent in interview and examination of patient, review of available records, and discussion with nursing staff. Continue care plan, efforts at therapy, and monitor nutritional status.                        63.5